# Patient Record
Sex: FEMALE | Race: WHITE | Employment: OTHER | URBAN - METROPOLITAN AREA
[De-identification: names, ages, dates, MRNs, and addresses within clinical notes are randomized per-mention and may not be internally consistent; named-entity substitution may affect disease eponyms.]

---

## 2020-03-17 NOTE — PROGRESS NOTES
Call received (pt at internal medicine for labs & has questions about medications to take the am of procedure, instructed pt is to only take pradaxa, prilosec & lisinoprl the am of procedure & can take ativan if needed.  Voiced understanding

## 2020-03-18 NOTE — PROGRESS NOTES
Pt called & notified of procedure cancellation secondary to COVID-19 threat - Instructed to notify [facility] as needed or for change in symptoms.  Voiced understanding

## 2020-03-19 ENCOUNTER — HOSPITAL ENCOUNTER (OUTPATIENT)
Dept: CARDIAC CATH/INVASIVE PROCEDURES | Age: 85
Discharge: HOME | End: 2020-03-19

## 2022-12-01 ENCOUNTER — OFFICE VISIT (OUTPATIENT)
Dept: ORTHOPEDIC SURGERY | Age: 87
End: 2022-12-01

## 2022-12-01 DIAGNOSIS — M25.512 LEFT SHOULDER PAIN, UNSPECIFIED CHRONICITY: ICD-10-CM

## 2022-12-01 DIAGNOSIS — S42.292A OTHER CLOSED DISPLACED FRACTURE OF PROXIMAL END OF LEFT HUMERUS, INITIAL ENCOUNTER: Primary | ICD-10-CM

## 2022-12-01 NOTE — PROGRESS NOTES
Name: [id number]  YOB: 1932  Gender: female  MRN: [id number]  Date of Encounter:  12/1/2022       CHIEF COMPLAINT:     Chief Complaint   Patient presents with    Shoulder Pain     Left humerus fx        SUBJECTIVE/OBJECTIVE:      HPI:    Patient is a 80 y.o. pleasant female who presents today for a new evaluation of her left shoulder. Date of injury / symptom onset: 11/21/22    She was walking with haste to the bathroom when she tripped on the door stripping and fell into the hallway wall. She was seen in the ED that day and found to have a proximal humerus fracture. She was tried 3 different slings that are not helpful for her. She has been taking tylenol, tramadol, and norco for pain. Her primary complaint is pain. She denies numbness. She has bruising down the entire arm and swelling. No history of DVT. She takes pradaxa. PAST HISTORY:   Past medical, surgical, family, social history and allergies reviewed by me. Pertinent history:   Tobacco use:  reports that she has never smoked. She has never used smokeless tobacco.  Diabetes: diabetic-non insulin  CKD: no  Anticoagulation: yes, pradaxa      REVIEW OF SYSTEMS:   As noted in HPI. PHYSICAL EXAMINATION:     Gen: Well-developed, no acute distress   HEENT: NC/AT, EOMI   Neck: Trachea midline, normal ROM   CV: Regular rhythm by palpation of distal pulse, normal capillary refill   Pulm: No respiratory distress, no stridor   Psychiatric: Well oriented, normal mood and affect. Skin: No rashes, lesions or ulcers, normal temperature, turgor, and texture on uninvolved extremity. ORTHO EXAM:    Left Shoulder:     Inspection: moderate ecchymosis of the arm, forearm, and hand with mild edema. ROM: ROM of shoulder not tested due to fracture  Tenderness: tenderness present to proximal humerus. She has minimal tenderness to distal humerus. No tenderness to epicondyles or olecranon. No forearm or hand TTP.    Strength: activates abduction, external rotation and internal rotation with pain. Active flexion, extension of elbow, supination and pronation to full ROM. 5/5    Provocative tests: not tested  Normal capillary refill / 2+ radial pulse   Sensation intact to light touch        DIAGNOSTIC IMAGING:     A 2 vw XR Grashey and scapular Y taken previously reviewed. XR shows mildly displaced proximal humerus fracture through surgical neck. Mild GH joint arthritis noted. No mass lesion. Cardiomegaly. I independently interpreted XR ordered by a different physician, taken from an outside facility    ASSESSMENT/PLAN:   1. Other closed displaced fracture of proximal end of left humerus, initial encounter    2. Left shoulder pain, unspecified chronicity         Explained xray findings, prognosis, and expected management of this fracture to patient and her daughter who is present. Her sling is not beneficial to her and she would likely benefit more from a cuff and collar. Immobilize in cuff and collar x 2 weeks. Continue tylenol / tramadol PRN pain. She is on [facility] [place]. Advised to use a stress ball and frequent hand / elbow ROM to reduce edema in arm. Reeval in 2 weeks. Orders / medications today:   Orders Placed This Encounter   Procedures    Hanging Cast Sling ()     Hanging Cast Sling ()     Standing Status:   Future     Standing Expiration Date:   3/1/2023      Follow up: Return in about 2 weeks (around 12/15/2022). The patient expressed understanding and agreed with the plan. Esha Stewart MD   [facility]     This document was created using voice recognition software so frequent mistakes are possible. For any concerns about the wording of this document, please contact its creator for further clarification.

## 2022-12-01 NOTE — PROGRESS NOTES
The patient's daughter received the hanging cast sling in the office, she was demonstrated on how to use the sling. If she has questions or needs to bring the patient back in to have a proper fitting, she is able to do so. Patient read and signed documenting they understand and agree to [facility]'s current DME return policy.

## 2022-12-01 NOTE — LETTER
DME Patient Authorization Form    Name: Aleja Candelaria  : 1932  MRN: [id number]   Age: 80 y.o. Gender: female  Delivery Address: [facility]     Diagnosis:     ICD-10-CM    1. Left shoulder pain, unspecified chronicity  M25.512 Hanging Cast Sling ()           Requested DME:  Hanging Cast APMUA- ($25.00) X 1 - left        Clinical Notes:     **Indicates non-covered items by insurance. Payment expected on date of service. Electronically signed by  Provider: Suni Mart MD__Date: 2022                            [facility]/[address] Tax ID # [id number]        Durable Medical Equipment and/or Orthotics Patient Consent     I understand that my physician has prescribed this medical supply as part of my treatment plan as a matter of Medical Necessity.  I understand that I have a choice in where I receive my prescribed orthopedic supplies and/or services.  I authorize [facility] to furnish this service/product and to provide my insurance carrier with any information requested in order to process for payment.  I instruct my insurance carrier to pay [facility] directly for these services/products.  I understand that my insurance carrier may deny payment for this supply because it is a non-covered item, deemed not medically necessary or considered experimental.   I understand that any cost not covered by my insurance carrier will be solely my financial responsibility.  I have received the Supplier Standards and have reviewed them.  I have received the prescribed item and have been fully instructed on the proper use of the above services/products.    ______ (Patient Initials) I understand that all DME items are non-returnable after being dispensed. Items still in sealed packaging may be returned up to 14 days after purchasing.  [facility] will replace items that are defective.    ______ (Patient Initials) I understand that [facility] will not file a claim with my insurance carrier for this service/product and I am waiving my right to file a claim on my own for this service/product with my insurance company as this item is NON-COVERED (Denoted by the **) by my Insurance company/policy. ______ (Patient Initials) I understand that I am responsible to bring my equipment to the hospital for any surgery. ______________________________________________  _______________________  Patient / Charli Harris            Thank you for considering [address] Your physician has prescribed specific medical equipment or devices for your home use. The following describes your rights and responsibilities as our customer. Right to Choose Providers: You have a choice regarding which company supplies your home medical equipment and devices, and to consult your physician in this decision. You may choose a medical supply store, a home medical equipment provider, or a specialist such as [facility]. [facility] will coordinate with your physician to provide the medical equipment or devices prescribed for your home use. Right to Service:  You have the right to considerate, respectful and nondiscriminatory care. You have the right to receive accurate and easily understood information about your health care. If you speak a foreign language, or don't understand the discussions, assistance will be provided to allow you to make informed health care decisions. You have the right to know your treatment options and to participate in decisions about your care, including the right to accept or refuse treatment. You have the right to expect a reasonable response to your requests for treatment or service.   You have the right to talk in confidence with health care providers and to have your health care information protected. You have the right to receive an explanation of your bill. You have the right to complain about the service or product you receive. Patient Responsibilities:  Please provide complete and accurate information about your health insurance benefits and make arrangements for the timely payment of your bill. [facility] will, if possible, assume responsibility for billing your insurance (Medicare, Medicaid and commercial) for the prescribed equipment or devices. If your policy does not cover the prescribed product, or only covers a portion of the bill, you are responsible for any remaining balance. Return and Exchange Policy:  [facility] will honor published  Warranties for products. [facility] will accept returns or exchanges within 14 days from the date of receipt, providin) the product must be in new condition; 2) receipt as required; and 3) used disposable and hygiene products may only be returned due to a defective product. Note: Refunds will be issued in a timely manner, please allow 4-6 weeks for processing. Complaint Procedures and DME Consumer Protection Resources:  [facility] values you as a customer, and is committed to resolving patient concerns. This commitment includes understanding and documenting your concerns, conducting a review of internal procedures, and providing you with an explanation and resolution to your concerns. Should you have any questions about our services or billing process, please contact our office at (practice phone number). If we are unable to resolve the concern, you have the right to direct comments to the office of Consumer Protection, in the [address] or the [facility] office, without fear of repercussion. DMEPOS SUPPLIER STANDARDS    A supplier must be in compliance with all applicable Federal and [employer] and regulatory requirements.   A supplier operations. A supplier must agree not to initiate telephone contact with beneficiaries, with a few exceptions allowed. This standard prohibits suppliers from calling beneficiaries in order to solicit new business. A supplier is responsible for delivery and must instruct beneficiaries on use of Medicare covered items, and maintain proof of delivery. A supplier must answer questions, and respond to complaints of the beneficiaries, and maintain documentation of such contacts. A supplier must maintain and replace at no charge or repair directly, or through a service contract with another company, Medicare covered items it has rented to beneficiaries. A supplier must accept returns of substandard (less than full quality for the particular item) or unsuitable items (inappropriate for the beneficiary at the time it was fitted and rented or sold) from beneficiaries. A supplier must disclose these supplier standards to each beneficiary to whom it supplies a Medicare-covered item. A supplier must disclose to the government any person having ownership, financial, or control interest in the supplier. A supplier must not convey or reassign a supplier number; i.e., the supplier may not sell or allow another entity to use its Medicare billing number. A supplier must have a complaint resolution protocol established to address beneficiary complaints that relate to these standards. A record of these complaints must be maintained at the physical facility. Complaint records must include: the name, address, telephone number and health insurance claim number of the beneficiary, a summary of the complaint, and any action taken to resolve it. A supplier must agree to furnish CMS any information required by the Medicare statute and implementing regulations. A supplier of DMEPOS and other items and services must be accredited by a CMS-approved accreditation organization in order to receive and retain a supplier billing number. The accreditation must indicate the specific products and services, for which the supplier is accredited in order for the supplier to receive payment for those specific products and services. A DMEPOS supplier must notify their accreditation organization when a new DMEPOS location is opened. The accreditation organization may accredit the new supplier location for three months after it is operational without requiring a new site visit. All DMEPOS supplier locations, whether owned or subcontracted, must meet the [employer] and be separately accredited in order to bill Medicare. An accredited supplier may be denied enrollment or their enrollment may be revoked, if CMS determines that they are not in compliance with the DMEPOS quality standards. A DMEPOS supplier must disclose upon enrollment all products and services, including the addition of new product lines for which they are seeking accreditation. If a new product line is added after enrollment, the DMEPOS supplier will be responsible for notifying the accrediting body of the new product so that the DMEPOS supplier can be re-surveyed and accredited for these new products. Must meet the surety bond requirements specified in 42 C. F.R. 424.57(c). Implementation date- May 4, 2009. A supplier must obtain oxygen from a state-licensed oxygen supplier. A supplier must maintain ordering and referring documentation consistent with provisions found in 42 C. F.R. 424.516(f). DMEPOS suppliers are prohibited from sharing a practice location with certain other Medicare providers and suppliers. DMEPOS suppliers must remain open to the public for a minimum of 30 hours per week with certain exceptions.

## 2022-12-15 ENCOUNTER — OFFICE VISIT (OUTPATIENT)
Dept: ORTHOPEDIC SURGERY | Age: 87
End: 2022-12-15

## 2022-12-15 DIAGNOSIS — S42.292D OTHER CLOSED DISPLACED FRACTURE OF PROXIMAL END OF LEFT HUMERUS WITH ROUTINE HEALING, SUBSEQUENT ENCOUNTER: Primary | ICD-10-CM

## 2022-12-15 NOTE — PROGRESS NOTES
Name: [id number]  YOB: 1932  Gender: female  MRN: [id number]  Date of Encounter:  12/15/2022       CHIEF COMPLAINT:     Chief Complaint   Patient presents with    Follow-up     Left humeral fx - 2 week f/u        SUBJECTIVE/OBJECTIVE:      HPI:    Patient is a 80 y.o. pleasant female who presents today for a return evaluation of her left proximal humerus fracture. Working diagnosis:   1. Other closed displaced fracture of proximal end of left humerus with routine healing, subsequent encounter       LOV: 12/1/22  DOI 11/21/22    She is 3 weeks post injury from her fracture. She has pain, daily, but it is improving. She takes pain medication daily. She has been wearing her cuff and collar and this helps. She has full use of her hand. No elbow pain. Swelling and bruising are improved. Some history provided by her daughter today as she is hard of hearing. PAST HISTORY:   Past medical, surgical, family, social history and allergies reviewed by me. Unchanged from prior visit. REVIEW OF SYSTEMS:   As noted in HPI. PHYSICAL EXAMINATION:     Gen: Well-developed, no acute distress   HEENT: NC/AT, EOMI   Neck: Trachea midline, normal ROM   CV: Regular rhythm by palpation of distal pulse, normal capillary refill   Pulm: No respiratory distress, no stridor   Psychiatric: Well oriented, normal mood and affect. Skin: No rashes, lesions or ulcers, normal temperature, turgor, and texture on uninvolved extremity. ORTHO EXAM:    Left Shoulder:     Inspection: healing ecchymosis, no obvious deformity  ROM: abduction to 45, external rotation to 25  Tenderness: mild proximal arm tenderness. No tenderness of elbow, forearm, or hand  Strength: assists abduction but does initiate active abduction. External rotation 4/5, Internal rotation 4/5.   5/5  Provocative tests: not tested  Normal capillary refill / 2+ radial pulse   Sensation intact to light touch       DIAGNOSTIC IMAGING:     I have reviewed prior imaging studies. ASSESSMENT/PLAN:   1. Other closed displaced fracture of proximal end of left humerus with routine healing, subsequent encounter         Advised today to begin weaning from her cuff and collar and start gentle range of motion activity / scapular motion. Take pain medication as necessary. We will see her back in 3 weeks due to holiday. Anticipate PT referral next visit. All questions answered. Orders / medications today: No orders of the defined types were placed in this encounter. Follow up: Return in about 20 days (around 1/4/2023). The patient expressed understanding and agreed with the plan. Kelsy Vaughn MD   [facility]     This document was created using voice recognition software so frequent mistakes are possible. For any concerns about the wording of this document, please contact its creator for further clarification.

## 2023-01-01 ENCOUNTER — PREP FOR PROCEDURE (OUTPATIENT)
Dept: NEUROSURGERY | Age: 88
End: 2023-01-01

## 2023-01-01 ENCOUNTER — APPOINTMENT (OUTPATIENT)
Dept: NON INVASIVE DIAGNOSTICS | Age: 88
DRG: 086 | End: 2023-01-01
Attending: INTERNAL MEDICINE
Payer: MEDICARE

## 2023-01-01 ENCOUNTER — ANESTHESIA EVENT (OUTPATIENT)
Dept: SURGERY | Age: 88
DRG: 086 | End: 2023-01-01
Payer: MEDICARE

## 2023-01-01 ENCOUNTER — HOSPICE ADMISSION (OUTPATIENT)
Dept: HOSPICE | Facility: HOSPICE | Age: 88
End: 2023-01-01
Payer: MEDICARE

## 2023-01-01 ENCOUNTER — APPOINTMENT (OUTPATIENT)
Dept: GENERAL RADIOLOGY | Age: 88
DRG: 086 | End: 2023-01-01
Attending: FAMILY MEDICINE
Payer: MEDICARE

## 2023-01-01 ENCOUNTER — HOSPITAL ENCOUNTER (INPATIENT)
Age: 88
LOS: 5 days | Discharge: HOSPICE | DRG: 086 | End: 2023-09-13
Attending: FAMILY MEDICINE | Admitting: FAMILY MEDICINE
Payer: MEDICARE

## 2023-01-01 ENCOUNTER — ANESTHESIA (OUTPATIENT)
Dept: SURGERY | Age: 88
DRG: 086 | End: 2023-01-01
Payer: MEDICARE

## 2023-01-01 ENCOUNTER — APPOINTMENT (OUTPATIENT)
Dept: CT IMAGING | Age: 88
DRG: 086 | End: 2023-01-01
Attending: FAMILY MEDICINE
Payer: MEDICARE

## 2023-01-01 VITALS
WEIGHT: 128 LBS | TEMPERATURE: 100.3 F | HEART RATE: 171 BPM | SYSTOLIC BLOOD PRESSURE: 164 MMHG | RESPIRATION RATE: 32 BRPM | HEIGHT: 62 IN | OXYGEN SATURATION: 83 % | BODY MASS INDEX: 23.55 KG/M2 | DIASTOLIC BLOOD PRESSURE: 74 MMHG

## 2023-01-01 DIAGNOSIS — I48.21 PERMANENT ATRIAL FIBRILLATION (HCC): Primary | ICD-10-CM

## 2023-01-01 DIAGNOSIS — S06.5XAA SUBDURAL HEMATOMA (HCC): ICD-10-CM

## 2023-01-01 LAB
ABO + RH BLD: NORMAL
ALBUMIN SERPL-MCNC: 3.4 G/DL (ref 3.2–4.6)
ALBUMIN/GLOB SERPL: 1.1 (ref 0.4–1.6)
ALP SERPL-CCNC: 64 U/L (ref 50–136)
ALT SERPL-CCNC: 34 U/L (ref 12–65)
ANION GAP SERPL CALC-SCNC: 11 MMOL/L (ref 2–11)
ANION GAP SERPL CALC-SCNC: 4 MMOL/L (ref 2–11)
ANION GAP SERPL CALC-SCNC: 6 MMOL/L (ref 2–11)
ANION GAP SERPL CALC-SCNC: 7 MMOL/L (ref 2–11)
ANION GAP SERPL CALC-SCNC: 9 MMOL/L (ref 2–11)
AST SERPL-CCNC: 28 U/L (ref 15–37)
BILIRUB SERPL-MCNC: 1.1 MG/DL (ref 0.2–1.1)
BLD PROD TYP BPU: NORMAL
BLD PROD TYP BPU: NORMAL
BLOOD BANK DISPENSE STATUS: NORMAL
BLOOD BANK DISPENSE STATUS: NORMAL
BLOOD GROUP ANTIBODIES SERPL: NORMAL
BPU ID: NORMAL
BPU ID: NORMAL
BUN SERPL-MCNC: 14 MG/DL (ref 8–23)
BUN SERPL-MCNC: 14 MG/DL (ref 8–23)
BUN SERPL-MCNC: 16 MG/DL (ref 8–23)
BUN SERPL-MCNC: 16 MG/DL (ref 8–23)
BUN SERPL-MCNC: 23 MG/DL (ref 8–23)
CALCIUM SERPL-MCNC: 8.7 MG/DL (ref 8.3–10.4)
CALCIUM SERPL-MCNC: 8.8 MG/DL (ref 8.3–10.4)
CALCIUM SERPL-MCNC: 8.9 MG/DL (ref 8.3–10.4)
CALCIUM SERPL-MCNC: 9.1 MG/DL (ref 8.3–10.4)
CALCIUM SERPL-MCNC: 9.2 MG/DL (ref 8.3–10.4)
CHLORIDE SERPL-SCNC: 108 MMOL/L (ref 101–110)
CHLORIDE SERPL-SCNC: 111 MMOL/L (ref 101–110)
CHLORIDE SERPL-SCNC: 113 MMOL/L (ref 101–110)
CHLORIDE SERPL-SCNC: 115 MMOL/L (ref 101–110)
CHLORIDE SERPL-SCNC: 117 MMOL/L (ref 101–110)
CO2 SERPL-SCNC: 19 MMOL/L (ref 21–32)
CO2 SERPL-SCNC: 24 MMOL/L (ref 21–32)
CO2 SERPL-SCNC: 24 MMOL/L (ref 21–32)
CO2 SERPL-SCNC: 25 MMOL/L (ref 21–32)
CO2 SERPL-SCNC: 27 MMOL/L (ref 21–32)
CREAT SERPL-MCNC: 0.8 MG/DL (ref 0.6–1)
CREAT SERPL-MCNC: 0.9 MG/DL (ref 0.6–1)
CREAT SERPL-MCNC: 1.3 MG/DL (ref 0.6–1)
ECHO AO ASC DIAM: 2.7 CM
ECHO AO ASCENDING AORTA INDEX: 1.71 CM/M2
ECHO AO ROOT DIAM: 2.4 CM
ECHO AO ROOT INDEX: 1.52 CM/M2
ECHO AV AREA PEAK VELOCITY: 1.5 CM2
ECHO AV AREA VTI: 1.6 CM2
ECHO AV AREA/BSA PEAK VELOCITY: 0.9 CM2/M2
ECHO AV AREA/BSA VTI: 1 CM2/M2
ECHO AV MEAN GRADIENT: 8 MMHG
ECHO AV MEAN VELOCITY: 1.3 M/S
ECHO AV PEAK GRADIENT: 14 MMHG
ECHO AV PEAK VELOCITY: 1.9 M/S
ECHO AV VELOCITY RATIO: 0.74
ECHO AV VTI: 33.2 CM
ECHO BSA: 1.59 M2
ECHO EST RA PRESSURE: 3 MMHG
ECHO IVC PROX: 1.2 CM
ECHO LA AREA 4C: 25.4 CM2
ECHO LA MAJOR AXIS: 6.5 CM
ECHO LA VOL 4C: 76 ML (ref 22–52)
ECHO LA VOLUME INDEX A4C: 48 ML/M2 (ref 16–34)
ECHO LV FRACTIONAL SHORTENING: 40 % (ref 28–44)
ECHO LV INTERNAL DIMENSION DIASTOLE INDEX: 2.72 CM/M2
ECHO LV INTERNAL DIMENSION DIASTOLIC: 4.3 CM (ref 3.9–5.3)
ECHO LV INTERNAL DIMENSION SYSTOLIC INDEX: 1.65 CM/M2
ECHO LV INTERNAL DIMENSION SYSTOLIC: 2.6 CM
ECHO LV IVSD: 0.8 CM (ref 0.6–0.9)
ECHO LV MASS 2D: 105.3 G (ref 67–162)
ECHO LV MASS INDEX 2D: 66.7 G/M2 (ref 43–95)
ECHO LV POSTERIOR WALL DIASTOLIC: 0.8 CM (ref 0.6–0.9)
ECHO LV RELATIVE WALL THICKNESS RATIO: 0.37
ECHO LVOT AREA: 2 CM2
ECHO LVOT AV VTI INDEX: 0.8
ECHO LVOT DIAM: 1.6 CM
ECHO LVOT MEAN GRADIENT: 4 MMHG
ECHO LVOT PEAK GRADIENT: 8 MMHG
ECHO LVOT PEAK VELOCITY: 1.4 M/S
ECHO LVOT STROKE VOLUME INDEX: 33.7 ML/M2
ECHO LVOT SV: 53.3 ML
ECHO LVOT VTI: 26.5 CM
ECHO MV E DECELERATION TIME (DT): 183 MS
ECHO MV E VELOCITY: 1.12 M/S
ECHO PV MAX VELOCITY: 1.4 M/S
ECHO PV PEAK GRADIENT: 8 MMHG
ECHO RIGHT VENTRICULAR SYSTOLIC PRESSURE (RVSP): 23 MMHG
ECHO RV TAPSE: 2.7 CM (ref 1.7–?)
ECHO TV REGURGITANT MAX VELOCITY: 2.21 M/S
ECHO TV REGURGITANT PEAK GRADIENT: 20 MMHG
EKG ATRIAL RATE: 107 BPM
EKG DIAGNOSIS: NORMAL
EKG Q-T INTERVAL: 336 MS
EKG QRS DURATION: 126 MS
EKG QTC CALCULATION (BAZETT): 444 MS
EKG R AXIS: -21 DEGREES
EKG T AXIS: 208 DEGREES
EKG VENTRICULAR RATE: 105 BPM
ERYTHROCYTE [DISTWIDTH] IN BLOOD BY AUTOMATED COUNT: 13.2 % (ref 11.9–14.6)
ERYTHROCYTE [DISTWIDTH] IN BLOOD BY AUTOMATED COUNT: 13.2 % (ref 11.9–14.6)
ERYTHROCYTE [DISTWIDTH] IN BLOOD BY AUTOMATED COUNT: 13.7 % (ref 11.9–14.6)
EST. AVERAGE GLUCOSE BLD GHB EST-MCNC: 151 MG/DL
GLOBULIN SER CALC-MCNC: 3 G/DL (ref 2.8–4.5)
GLUCOSE BLD STRIP.AUTO-MCNC: 258 MG/DL (ref 65–100)
GLUCOSE BLD STRIP.AUTO-MCNC: 279 MG/DL (ref 65–100)
GLUCOSE BLD STRIP.AUTO-MCNC: 299 MG/DL (ref 65–100)
GLUCOSE BLD STRIP.AUTO-MCNC: 397 MG/DL (ref 65–100)
GLUCOSE SERPL-MCNC: 229 MG/DL (ref 65–100)
GLUCOSE SERPL-MCNC: 245 MG/DL (ref 65–100)
GLUCOSE SERPL-MCNC: 266 MG/DL (ref 65–100)
GLUCOSE SERPL-MCNC: 306 MG/DL (ref 65–100)
GLUCOSE SERPL-MCNC: 423 MG/DL (ref 65–100)
HBA1C MFR BLD: 6.9 % (ref 4.8–5.6)
HCT VFR BLD AUTO: 34 % (ref 35.8–46.3)
HCT VFR BLD AUTO: 38.1 % (ref 35.8–46.3)
HCT VFR BLD AUTO: 38.9 % (ref 35.8–46.3)
HGB BLD-MCNC: 11.3 G/DL (ref 11.7–15.4)
HGB BLD-MCNC: 12.5 G/DL (ref 11.7–15.4)
HGB BLD-MCNC: 12.6 G/DL (ref 11.7–15.4)
MAGNESIUM SERPL-MCNC: 2 MG/DL (ref 1.8–2.4)
MAGNESIUM SERPL-MCNC: 2.1 MG/DL (ref 1.8–2.4)
MCH RBC QN AUTO: 30.8 PG (ref 26.1–32.9)
MCH RBC QN AUTO: 31 PG (ref 26.1–32.9)
MCH RBC QN AUTO: 31.2 PG (ref 26.1–32.9)
MCHC RBC AUTO-ENTMCNC: 32.4 G/DL (ref 31.4–35)
MCHC RBC AUTO-ENTMCNC: 32.8 G/DL (ref 31.4–35)
MCHC RBC AUTO-ENTMCNC: 33.2 G/DL (ref 31.4–35)
MCV RBC AUTO: 93.9 FL (ref 82–102)
MCV RBC AUTO: 94.5 FL (ref 82–102)
MCV RBC AUTO: 95.1 FL (ref 82–102)
NRBC # BLD: 0 K/UL (ref 0–0.2)
PHOSPHATE SERPL-MCNC: 2 MG/DL (ref 2.3–3.7)
PHOSPHATE SERPL-MCNC: 4 MG/DL (ref 2.3–3.7)
PLATELET # BLD AUTO: 141 K/UL (ref 150–450)
PLATELET # BLD AUTO: 144 K/UL (ref 150–450)
PLATELET # BLD AUTO: 165 K/UL (ref 150–450)
PMV BLD AUTO: 10.1 FL (ref 9.4–12.3)
PMV BLD AUTO: 10.3 FL (ref 9.4–12.3)
PMV BLD AUTO: 9.9 FL (ref 9.4–12.3)
POTASSIUM SERPL-SCNC: 3.4 MMOL/L (ref 3.5–5.1)
POTASSIUM SERPL-SCNC: 3.7 MMOL/L (ref 3.5–5.1)
POTASSIUM SERPL-SCNC: 4.2 MMOL/L (ref 3.5–5.1)
POTASSIUM SERPL-SCNC: 4.3 MMOL/L (ref 3.5–5.1)
POTASSIUM SERPL-SCNC: 4.6 MMOL/L (ref 3.5–5.1)
PROT SERPL-MCNC: 6.4 G/DL (ref 6.3–8.2)
RBC # BLD AUTO: 3.62 M/UL (ref 4.05–5.2)
RBC # BLD AUTO: 4.03 M/UL (ref 4.05–5.2)
RBC # BLD AUTO: 4.09 M/UL (ref 4.05–5.2)
SERVICE CMNT-IMP: ABNORMAL
SODIUM SERPL-SCNC: 142 MMOL/L (ref 133–143)
SODIUM SERPL-SCNC: 143 MMOL/L (ref 133–143)
SODIUM SERPL-SCNC: 143 MMOL/L (ref 133–143)
SODIUM SERPL-SCNC: 145 MMOL/L (ref 133–143)
SODIUM SERPL-SCNC: 147 MMOL/L (ref 133–143)
SPECIMEN EXP DATE BLD: NORMAL
UNIT DIVISION: 0
UNIT DIVISION: 0
WBC # BLD AUTO: 10.9 K/UL (ref 4.3–11.1)
WBC # BLD AUTO: 6.7 K/UL (ref 4.3–11.1)
WBC # BLD AUTO: 9 K/UL (ref 4.3–11.1)

## 2023-01-01 PROCEDURE — 6360000002 HC RX W HCPCS: Performed by: INTERNAL MEDICINE

## 2023-01-01 PROCEDURE — C9113 INJ PANTOPRAZOLE SODIUM, VIA: HCPCS | Performed by: INTERNAL MEDICINE

## 2023-01-01 PROCEDURE — 36415 COLL VENOUS BLD VENIPUNCTURE: CPT

## 2023-01-01 PROCEDURE — 92610 EVALUATE SWALLOWING FUNCTION: CPT

## 2023-01-01 PROCEDURE — 6360000002 HC RX W HCPCS: Performed by: FAMILY MEDICINE

## 2023-01-01 PROCEDURE — 6370000000 HC RX 637 (ALT 250 FOR IP): Performed by: FAMILY MEDICINE

## 2023-01-01 PROCEDURE — 2580000003 HC RX 258: Performed by: INTERNAL MEDICINE

## 2023-01-01 PROCEDURE — 70450 CT HEAD/BRAIN W/O DYE: CPT

## 2023-01-01 PROCEDURE — 6370000000 HC RX 637 (ALT 250 FOR IP): Performed by: INTERNAL MEDICINE

## 2023-01-01 PROCEDURE — 94640 AIRWAY INHALATION TREATMENT: CPT

## 2023-01-01 PROCEDURE — 36430 TRANSFUSION BLD/BLD COMPNT: CPT

## 2023-01-01 PROCEDURE — 83735 ASSAY OF MAGNESIUM: CPT

## 2023-01-01 PROCEDURE — 51701 INSERT BLADDER CATHETER: CPT

## 2023-01-01 PROCEDURE — 82962 GLUCOSE BLOOD TEST: CPT

## 2023-01-01 PROCEDURE — 2580000003 HC RX 258: Performed by: FAMILY MEDICINE

## 2023-01-01 PROCEDURE — A4216 STERILE WATER/SALINE, 10 ML: HCPCS | Performed by: INTERNAL MEDICINE

## 2023-01-01 PROCEDURE — 86901 BLOOD TYPING SEROLOGIC RH(D): CPT

## 2023-01-01 PROCEDURE — 93005 ELECTROCARDIOGRAM TRACING: CPT | Performed by: INTERNAL MEDICINE

## 2023-01-01 PROCEDURE — 97530 THERAPEUTIC ACTIVITIES: CPT

## 2023-01-01 PROCEDURE — 51702 INSERT TEMP BLADDER CATH: CPT

## 2023-01-01 PROCEDURE — 30233R1 TRANSFUSION OF NONAUTOLOGOUS PLATELETS INTO PERIPHERAL VEIN, PERCUTANEOUS APPROACH: ICD-10-PCS | Performed by: INTERNAL MEDICINE

## 2023-01-01 PROCEDURE — 2100000000 HC CCU R&B

## 2023-01-01 PROCEDURE — 0656 HSPC GENERAL INPATIENT

## 2023-01-01 PROCEDURE — 84100 ASSAY OF PHOSPHORUS: CPT

## 2023-01-01 PROCEDURE — 2500000003 HC RX 250 WO HCPCS: Performed by: INTERNAL MEDICINE

## 2023-01-01 PROCEDURE — 85027 COMPLETE CBC AUTOMATED: CPT

## 2023-01-01 PROCEDURE — 86850 RBC ANTIBODY SCREEN: CPT

## 2023-01-01 PROCEDURE — 83036 HEMOGLOBIN GLYCOSYLATED A1C: CPT

## 2023-01-01 PROCEDURE — 80048 BASIC METABOLIC PNL TOTAL CA: CPT

## 2023-01-01 PROCEDURE — 97163 PT EVAL HIGH COMPLEX 45 MIN: CPT

## 2023-01-01 PROCEDURE — 1100000000 HC RM PRIVATE

## 2023-01-01 PROCEDURE — 6370000000 HC RX 637 (ALT 250 FOR IP)

## 2023-01-01 PROCEDURE — 93306 TTE W/DOPPLER COMPLETE: CPT | Performed by: INTERNAL MEDICINE

## 2023-01-01 PROCEDURE — 86900 BLOOD TYPING SEROLOGIC ABO: CPT

## 2023-01-01 PROCEDURE — P9035 PLATELET PHERES LEUKOREDUCED: HCPCS

## 2023-01-01 PROCEDURE — 2500000003 HC RX 250 WO HCPCS: Performed by: FAMILY MEDICINE

## 2023-01-01 PROCEDURE — 51798 US URINE CAPACITY MEASURE: CPT

## 2023-01-01 PROCEDURE — 71045 X-RAY EXAM CHEST 1 VIEW: CPT

## 2023-01-01 PROCEDURE — 80053 COMPREHEN METABOLIC PANEL: CPT

## 2023-01-01 PROCEDURE — 97535 SELF CARE MNGMENT TRAINING: CPT

## 2023-01-01 PROCEDURE — 92526 ORAL FUNCTION THERAPY: CPT

## 2023-01-01 PROCEDURE — 97167 OT EVAL HIGH COMPLEX 60 MIN: CPT

## 2023-01-01 PROCEDURE — 93306 TTE W/DOPPLER COMPLETE: CPT

## 2023-01-01 RX ORDER — POTASSIUM CHLORIDE 7.45 MG/ML
10 INJECTION INTRAVENOUS PRN
Status: DISCONTINUED | OUTPATIENT
Start: 2023-01-01 | End: 2023-01-01

## 2023-01-01 RX ORDER — SODIUM CHLORIDE 0.9 % (FLUSH) 0.9 %
5-40 SYRINGE (ML) INJECTION PRN
Status: CANCELLED | OUTPATIENT
Start: 2023-01-01

## 2023-01-01 RX ORDER — SODIUM CHLORIDE 9 MG/ML
INJECTION, SOLUTION INTRAVENOUS PRN
Status: DISCONTINUED | OUTPATIENT
Start: 2023-01-01 | End: 2023-01-01

## 2023-01-01 RX ORDER — HYDRALAZINE HYDROCHLORIDE 20 MG/ML
10 INJECTION INTRAMUSCULAR; INTRAVENOUS EVERY 4 HOURS PRN
Status: DISCONTINUED | OUTPATIENT
Start: 2023-01-01 | End: 2023-01-01

## 2023-01-01 RX ORDER — MIDAZOLAM HYDROCHLORIDE 2 MG/2ML
2 INJECTION, SOLUTION INTRAMUSCULAR; INTRAVENOUS
Status: CANCELLED | OUTPATIENT
Start: 2023-01-01 | End: 2023-01-01

## 2023-01-01 RX ORDER — ACETAMINOPHEN 325 MG/1
650 TABLET ORAL EVERY 4 HOURS PRN
Status: DISCONTINUED | OUTPATIENT
Start: 2023-01-01 | End: 2023-01-01 | Stop reason: HOSPADM

## 2023-01-01 RX ORDER — FENTANYL CITRATE 50 UG/ML
100 INJECTION, SOLUTION INTRAMUSCULAR; INTRAVENOUS
Status: CANCELLED | OUTPATIENT
Start: 2023-01-01 | End: 2023-01-01

## 2023-01-01 RX ORDER — PROPRANOLOL HCL 60 MG
60 CAPSULE, EXTENDED RELEASE 24HR ORAL DAILY
Status: ON HOLD | COMMUNITY
End: 2023-01-01 | Stop reason: HOSPADM

## 2023-01-01 RX ORDER — MORPHINE SULFATE 2 MG/ML
2 INJECTION, SOLUTION INTRAMUSCULAR; INTRAVENOUS
Status: DISCONTINUED | OUTPATIENT
Start: 2023-01-01 | End: 2023-01-01 | Stop reason: HOSPADM

## 2023-01-01 RX ORDER — IPRATROPIUM BROMIDE AND ALBUTEROL SULFATE 2.5; .5 MG/3ML; MG/3ML
1 SOLUTION RESPIRATORY (INHALATION) EVERY 4 HOURS PRN
Status: DISCONTINUED | OUTPATIENT
Start: 2023-01-01 | End: 2023-01-01

## 2023-01-01 RX ORDER — ONDANSETRON 2 MG/ML
4 INJECTION INTRAMUSCULAR; INTRAVENOUS EVERY 6 HOURS PRN
Status: DISCONTINUED | OUTPATIENT
Start: 2023-01-01 | End: 2023-01-01

## 2023-01-01 RX ORDER — HYDRALAZINE HYDROCHLORIDE 20 MG/ML
10 INJECTION INTRAMUSCULAR; INTRAVENOUS
Status: CANCELLED | OUTPATIENT
Start: 2023-01-01

## 2023-01-01 RX ORDER — PANTOPRAZOLE SODIUM 40 MG/1
40 TABLET, DELAYED RELEASE ORAL
Status: DISCONTINUED | OUTPATIENT
Start: 2023-01-01 | End: 2023-01-01

## 2023-01-01 RX ORDER — GUAIFENESIN 600 MG/1
600 TABLET, EXTENDED RELEASE ORAL 2 TIMES DAILY
Status: DISCONTINUED | OUTPATIENT
Start: 2023-01-01 | End: 2023-01-01

## 2023-01-01 RX ORDER — SODIUM CHLORIDE 9 MG/ML
INJECTION, SOLUTION INTRAVENOUS PRN
Status: CANCELLED | OUTPATIENT
Start: 2023-01-01

## 2023-01-01 RX ORDER — DEXTROSE AND SODIUM CHLORIDE 5; .9 G/100ML; G/100ML
INJECTION, SOLUTION INTRAVENOUS CONTINUOUS
Status: DISCONTINUED | OUTPATIENT
Start: 2023-01-01 | End: 2023-01-01

## 2023-01-01 RX ORDER — LANOLIN ALCOHOL/MO/W.PET/CERES
400 CREAM (GRAM) TOPICAL DAILY
Status: DISCONTINUED | OUTPATIENT
Start: 2023-01-01 | End: 2023-01-01

## 2023-01-01 RX ORDER — PROCHLORPERAZINE EDISYLATE 5 MG/ML
5 INJECTION INTRAMUSCULAR; INTRAVENOUS
Status: CANCELLED | OUTPATIENT
Start: 2023-01-01 | End: 2023-01-01

## 2023-01-01 RX ORDER — OXYCODONE HYDROCHLORIDE 5 MG/1
5 TABLET ORAL
Status: CANCELLED | OUTPATIENT
Start: 2023-01-01 | End: 2023-01-01

## 2023-01-01 RX ORDER — INSULIN LISPRO 100 [IU]/ML
0-4 INJECTION, SOLUTION INTRAVENOUS; SUBCUTANEOUS NIGHTLY
Status: DISCONTINUED | OUTPATIENT
Start: 2023-01-01 | End: 2023-01-01

## 2023-01-01 RX ORDER — HYDROMORPHONE HYDROCHLORIDE 2 MG/ML
0.25 INJECTION, SOLUTION INTRAMUSCULAR; INTRAVENOUS; SUBCUTANEOUS EVERY 5 MIN PRN
Status: CANCELLED | OUTPATIENT
Start: 2023-01-01

## 2023-01-01 RX ORDER — MORPHINE SULFATE 2 MG/ML
2 INJECTION, SOLUTION INTRAMUSCULAR; INTRAVENOUS EVERY 4 HOURS PRN
Status: DISCONTINUED | OUTPATIENT
Start: 2023-01-01 | End: 2023-01-01

## 2023-01-01 RX ORDER — ONDANSETRON 2 MG/ML
4 INJECTION INTRAMUSCULAR; INTRAVENOUS
Status: CANCELLED | OUTPATIENT
Start: 2023-01-01 | End: 2023-01-01

## 2023-01-01 RX ORDER — IPRATROPIUM BROMIDE AND ALBUTEROL SULFATE 2.5; .5 MG/3ML; MG/3ML
1 SOLUTION RESPIRATORY (INHALATION) ONCE
Status: COMPLETED | OUTPATIENT
Start: 2023-01-01 | End: 2023-01-01

## 2023-01-01 RX ORDER — INSULIN LISPRO 100 [IU]/ML
0-8 INJECTION, SOLUTION INTRAVENOUS; SUBCUTANEOUS
Status: DISCONTINUED | OUTPATIENT
Start: 2023-01-01 | End: 2023-01-01

## 2023-01-01 RX ORDER — LEVETIRACETAM 500 MG/5ML
1000 INJECTION, SOLUTION, CONCENTRATE INTRAVENOUS ONCE
Status: COMPLETED | OUTPATIENT
Start: 2023-01-01 | End: 2023-01-01

## 2023-01-01 RX ORDER — LABETALOL HYDROCHLORIDE 5 MG/ML
10 INJECTION, SOLUTION INTRAVENOUS
Status: DISCONTINUED | OUTPATIENT
Start: 2023-01-01 | End: 2023-01-01

## 2023-01-01 RX ORDER — POTASSIUM CHLORIDE 20 MEQ/1
40 TABLET, EXTENDED RELEASE ORAL PRN
Status: DISCONTINUED | OUTPATIENT
Start: 2023-01-01 | End: 2023-01-01

## 2023-01-01 RX ORDER — ONDANSETRON 4 MG/1
4 TABLET, ORALLY DISINTEGRATING ORAL EVERY 8 HOURS PRN
Status: DISCONTINUED | OUTPATIENT
Start: 2023-01-01 | End: 2023-01-01

## 2023-01-01 RX ORDER — MORPHINE SULFATE 2 MG/ML
1 INJECTION, SOLUTION INTRAMUSCULAR; INTRAVENOUS ONCE
Status: COMPLETED | OUTPATIENT
Start: 2023-01-01 | End: 2023-01-01

## 2023-01-01 RX ORDER — HYDROMORPHONE HYDROCHLORIDE 2 MG/ML
0.5 INJECTION, SOLUTION INTRAMUSCULAR; INTRAVENOUS; SUBCUTANEOUS EVERY 5 MIN PRN
Status: CANCELLED | OUTPATIENT
Start: 2023-01-01

## 2023-01-01 RX ORDER — MAGNESIUM SULFATE IN WATER 40 MG/ML
2000 INJECTION, SOLUTION INTRAVENOUS PRN
Status: DISCONTINUED | OUTPATIENT
Start: 2023-01-01 | End: 2023-01-01

## 2023-01-01 RX ORDER — POTASSIUM CHLORIDE 29.8 MG/ML
20 INJECTION INTRAVENOUS PRN
Status: DISCONTINUED | OUTPATIENT
Start: 2023-01-01 | End: 2023-01-01

## 2023-01-01 RX ORDER — LEVETIRACETAM 500 MG/5ML
500 INJECTION, SOLUTION, CONCENTRATE INTRAVENOUS EVERY 12 HOURS
Status: DISCONTINUED | OUTPATIENT
Start: 2023-01-01 | End: 2023-01-01

## 2023-01-01 RX ORDER — MORPHINE SULFATE 2 MG/ML
2 INJECTION, SOLUTION INTRAMUSCULAR; INTRAVENOUS ONCE
Status: COMPLETED | OUTPATIENT
Start: 2023-01-01 | End: 2023-01-01

## 2023-01-01 RX ORDER — SODIUM CHLORIDE 0.9 % (FLUSH) 0.9 %
5-40 SYRINGE (ML) INJECTION PRN
Status: DISCONTINUED | OUTPATIENT
Start: 2023-01-01 | End: 2023-01-01

## 2023-01-01 RX ORDER — ACETAMINOPHEN 500 MG
1000 TABLET ORAL ONCE
Status: CANCELLED | OUTPATIENT
Start: 2023-01-01 | End: 2023-01-01

## 2023-01-01 RX ORDER — SODIUM CHLORIDE 0.9 % (FLUSH) 0.9 %
5-40 SYRINGE (ML) INJECTION EVERY 12 HOURS SCHEDULED
Status: CANCELLED | OUTPATIENT
Start: 2023-01-01

## 2023-01-01 RX ORDER — SODIUM CHLORIDE 0.9 % (FLUSH) 0.9 %
5-40 SYRINGE (ML) INJECTION EVERY 12 HOURS SCHEDULED
Status: DISCONTINUED | OUTPATIENT
Start: 2023-01-01 | End: 2023-01-01

## 2023-01-01 RX ORDER — LABETALOL HYDROCHLORIDE 5 MG/ML
10 INJECTION, SOLUTION INTRAVENOUS
Status: CANCELLED | OUTPATIENT
Start: 2023-01-01

## 2023-01-01 RX ORDER — SODIUM CHLORIDE, SODIUM LACTATE, POTASSIUM CHLORIDE, CALCIUM CHLORIDE 600; 310; 30; 20 MG/100ML; MG/100ML; MG/100ML; MG/100ML
INJECTION, SOLUTION INTRAVENOUS CONTINUOUS
Status: DISCONTINUED | OUTPATIENT
Start: 2023-01-01 | End: 2023-01-01

## 2023-01-01 RX ORDER — ACETAMINOPHEN 650 MG/1
650 SUPPOSITORY RECTAL EVERY 4 HOURS PRN
Status: DISCONTINUED | OUTPATIENT
Start: 2023-01-01 | End: 2023-01-01 | Stop reason: HOSPADM

## 2023-01-01 RX ORDER — SODIUM CHLORIDE, SODIUM LACTATE, POTASSIUM CHLORIDE, CALCIUM CHLORIDE 600; 310; 30; 20 MG/100ML; MG/100ML; MG/100ML; MG/100ML
INJECTION, SOLUTION INTRAVENOUS CONTINUOUS
Status: CANCELLED | OUTPATIENT
Start: 2023-01-01

## 2023-01-01 RX ORDER — LORAZEPAM 2 MG/ML
0.5 INJECTION INTRAMUSCULAR
Status: DISCONTINUED | OUTPATIENT
Start: 2023-01-01 | End: 2023-01-01 | Stop reason: HOSPADM

## 2023-01-01 RX ORDER — LIDOCAINE HYDROCHLORIDE 10 MG/ML
1 INJECTION, SOLUTION INFILTRATION; PERINEURAL
Status: CANCELLED | OUTPATIENT
Start: 2023-01-01 | End: 2023-01-01

## 2023-01-01 RX ADMIN — INSULIN LISPRO 4 UNITS: 100 INJECTION, SOLUTION INTRAVENOUS; SUBCUTANEOUS at 11:49

## 2023-01-01 RX ADMIN — LABETALOL HYDROCHLORIDE 10 MG: 5 INJECTION INTRAVENOUS at 10:07

## 2023-01-01 RX ADMIN — MORPHINE SULFATE 2 MG: 2 INJECTION, SOLUTION INTRAMUSCULAR; INTRAVENOUS at 04:40

## 2023-01-01 RX ADMIN — ACETAMINOPHEN 650 MG: 650 SUPPOSITORY RECTAL at 12:52

## 2023-01-01 RX ADMIN — IPRATROPIUM BROMIDE AND ALBUTEROL SULFATE 1 DOSE: .5; 3 SOLUTION RESPIRATORY (INHALATION) at 19:45

## 2023-01-01 RX ADMIN — SODIUM CHLORIDE 6.5 MG/HR: 9 INJECTION, SOLUTION INTRAVENOUS at 14:38

## 2023-01-01 RX ADMIN — MORPHINE SULFATE 2 MG: 2 INJECTION, SOLUTION INTRAMUSCULAR; INTRAVENOUS at 18:53

## 2023-01-01 RX ADMIN — SODIUM CHLORIDE 7 MG/HR: 9 INJECTION, SOLUTION INTRAVENOUS at 21:22

## 2023-01-01 RX ADMIN — MORPHINE SULFATE 2 MG: 2 INJECTION, SOLUTION INTRAMUSCULAR; INTRAVENOUS at 09:25

## 2023-01-01 RX ADMIN — MORPHINE SULFATE 2 MG: 2 INJECTION, SOLUTION INTRAMUSCULAR; INTRAVENOUS at 12:54

## 2023-01-01 RX ADMIN — DEXTROSE AND SODIUM CHLORIDE: 5; 900 INJECTION, SOLUTION INTRAVENOUS at 17:48

## 2023-01-01 RX ADMIN — LEVETIRACETAM 500 MG: 500 INJECTION, SOLUTION INTRAVENOUS at 07:28

## 2023-01-01 RX ADMIN — ACETAMINOPHEN 650 MG: 650 SUPPOSITORY RECTAL at 16:04

## 2023-01-01 RX ADMIN — ACETAMINOPHEN 650 MG: 650 SUPPOSITORY RECTAL at 18:32

## 2023-01-01 RX ADMIN — LEVETIRACETAM 1000 MG: 500 INJECTION, SOLUTION INTRAVENOUS at 23:52

## 2023-01-01 RX ADMIN — LABETALOL HYDROCHLORIDE 10 MG: 5 INJECTION INTRAVENOUS at 04:31

## 2023-01-01 RX ADMIN — DEXTROSE AND SODIUM CHLORIDE: 5; 900 INJECTION, SOLUTION INTRAVENOUS at 23:26

## 2023-01-01 RX ADMIN — SODIUM CHLORIDE 9.5 MG/HR: 9 INJECTION, SOLUTION INTRAVENOUS at 03:58

## 2023-01-01 RX ADMIN — POTASSIUM CHLORIDE 10 MEQ: 7.46 INJECTION, SOLUTION INTRAVENOUS at 16:04

## 2023-01-01 RX ADMIN — POTASSIUM PHOSPHATE, MONOBASIC AND POTASSIUM PHOSPHATE, DIBASIC 15 MMOL: 224; 236 INJECTION, SOLUTION, CONCENTRATE INTRAVENOUS at 17:38

## 2023-01-01 RX ADMIN — LORAZEPAM 0.5 MG: 2 INJECTION INTRAMUSCULAR; INTRAVENOUS at 05:33

## 2023-01-01 RX ADMIN — INSULIN LISPRO 8 UNITS: 100 INJECTION, SOLUTION INTRAVENOUS; SUBCUTANEOUS at 07:35

## 2023-01-01 RX ADMIN — SODIUM CHLORIDE 40 MG: 9 INJECTION INTRAMUSCULAR; INTRAVENOUS; SUBCUTANEOUS at 07:27

## 2023-01-01 RX ADMIN — ACETAMINOPHEN 650 MG: 650 SUPPOSITORY RECTAL at 22:56

## 2023-01-01 RX ADMIN — SODIUM CHLORIDE 40 MG: 9 INJECTION INTRAMUSCULAR; INTRAVENOUS; SUBCUTANEOUS at 07:46

## 2023-01-01 RX ADMIN — POTASSIUM CHLORIDE 10 MEQ: 7.46 INJECTION, SOLUTION INTRAVENOUS at 19:55

## 2023-01-01 RX ADMIN — LEVETIRACETAM 500 MG: 500 INJECTION, SOLUTION INTRAVENOUS at 22:10

## 2023-01-01 RX ADMIN — LEVETIRACETAM 500 MG: 500 INJECTION, SOLUTION INTRAVENOUS at 10:28

## 2023-01-01 RX ADMIN — SODIUM CHLORIDE 7 MG/HR: 9 INJECTION, SOLUTION INTRAVENOUS at 06:56

## 2023-01-01 RX ADMIN — SODIUM CHLORIDE, PRESERVATIVE FREE 10 ML: 5 INJECTION INTRAVENOUS at 23:52

## 2023-01-01 RX ADMIN — SODIUM CHLORIDE, PRESERVATIVE FREE 10 ML: 5 INJECTION INTRAVENOUS at 21:06

## 2023-01-01 RX ADMIN — MORPHINE SULFATE 2 MG: 2 INJECTION, SOLUTION INTRAMUSCULAR; INTRAVENOUS at 15:15

## 2023-01-01 RX ADMIN — LEVETIRACETAM 500 MG: 500 INJECTION, SOLUTION INTRAVENOUS at 09:46

## 2023-01-01 RX ADMIN — SODIUM CHLORIDE 5 MG/HR: 9 INJECTION, SOLUTION INTRAVENOUS at 01:48

## 2023-01-01 RX ADMIN — LORAZEPAM 0.5 MG: 2 INJECTION INTRAMUSCULAR; INTRAVENOUS at 09:22

## 2023-01-01 RX ADMIN — HYDRALAZINE HYDROCHLORIDE 10 MG: 20 INJECTION INTRAMUSCULAR; INTRAVENOUS at 22:52

## 2023-01-01 RX ADMIN — SODIUM CHLORIDE 5 MG/HR: 900 INJECTION, SOLUTION INTRAVENOUS at 09:00

## 2023-01-01 RX ADMIN — POTASSIUM CHLORIDE 10 MEQ: 7.46 INJECTION, SOLUTION INTRAVENOUS at 17:30

## 2023-01-01 RX ADMIN — LABETALOL HYDROCHLORIDE 10 MG: 5 INJECTION INTRAVENOUS at 15:25

## 2023-01-01 RX ADMIN — LABETALOL HYDROCHLORIDE 10 MG: 5 INJECTION INTRAVENOUS at 12:46

## 2023-01-01 RX ADMIN — MAGNESIUM GLUCONATE 500 MG ORAL TABLET 400 MG: 500 TABLET ORAL at 07:40

## 2023-01-01 RX ADMIN — SODIUM CHLORIDE 7 MG/HR: 9 INJECTION, SOLUTION INTRAVENOUS at 17:47

## 2023-01-01 RX ADMIN — SODIUM CHLORIDE, PRESERVATIVE FREE 10 ML: 5 INJECTION INTRAVENOUS at 21:24

## 2023-01-01 RX ADMIN — LEVETIRACETAM 500 MG: 500 INJECTION, SOLUTION INTRAVENOUS at 23:55

## 2023-01-01 RX ADMIN — MORPHINE SULFATE 2 MG: 2 INJECTION, SOLUTION INTRAMUSCULAR; INTRAVENOUS at 10:25

## 2023-01-01 RX ADMIN — DEXTROSE AND SODIUM CHLORIDE: 5; 900 INJECTION, SOLUTION INTRAVENOUS at 10:00

## 2023-01-01 RX ADMIN — LABETALOL HYDROCHLORIDE 10 MG: 5 INJECTION INTRAVENOUS at 06:14

## 2023-01-01 RX ADMIN — SODIUM CHLORIDE, PRESERVATIVE FREE 10 ML: 5 INJECTION INTRAVENOUS at 09:39

## 2023-01-01 RX ADMIN — SODIUM CHLORIDE, PRESERVATIVE FREE 10 ML: 5 INJECTION INTRAVENOUS at 20:18

## 2023-01-01 RX ADMIN — LEVETIRACETAM 500 MG: 500 INJECTION, SOLUTION INTRAVENOUS at 22:43

## 2023-01-01 RX ADMIN — IPRATROPIUM BROMIDE AND ALBUTEROL SULFATE 1 DOSE: .5; 3 SOLUTION RESPIRATORY (INHALATION) at 02:18

## 2023-01-01 RX ADMIN — HYDRALAZINE HYDROCHLORIDE 10 MG: 20 INJECTION INTRAMUSCULAR; INTRAVENOUS at 14:50

## 2023-01-01 RX ADMIN — SODIUM CHLORIDE 40 MG: 9 INJECTION INTRAMUSCULAR; INTRAVENOUS; SUBCUTANEOUS at 09:38

## 2023-01-01 RX ADMIN — LABETALOL HYDROCHLORIDE 10 MG: 5 INJECTION INTRAVENOUS at 01:06

## 2023-01-01 RX ADMIN — MORPHINE SULFATE 1 MG: 2 INJECTION, SOLUTION INTRAMUSCULAR; INTRAVENOUS at 17:54

## 2023-01-01 RX ADMIN — LABETALOL HYDROCHLORIDE 10 MG: 5 INJECTION INTRAVENOUS at 01:48

## 2023-01-01 RX ADMIN — SODIUM CHLORIDE 40 MG: 9 INJECTION INTRAMUSCULAR; INTRAVENOUS; SUBCUTANEOUS at 14:29

## 2023-01-01 RX ADMIN — POTASSIUM CHLORIDE 10 MEQ: 7.46 INJECTION, SOLUTION INTRAVENOUS at 22:12

## 2023-01-01 RX ADMIN — SODIUM CHLORIDE, PRESERVATIVE FREE 10 ML: 5 INJECTION INTRAVENOUS at 07:46

## 2023-01-01 RX ADMIN — HYDRALAZINE HYDROCHLORIDE 10 MG: 20 INJECTION INTRAMUSCULAR; INTRAVENOUS at 00:57

## 2023-01-01 RX ADMIN — SODIUM CHLORIDE 5 MG/HR: 9 INJECTION, SOLUTION INTRAVENOUS at 17:49

## 2023-01-01 RX ADMIN — SODIUM CHLORIDE, PRESERVATIVE FREE 10 ML: 5 INJECTION INTRAVENOUS at 09:48

## 2023-01-01 RX ADMIN — INSULIN LISPRO 4 UNITS: 100 INJECTION, SOLUTION INTRAVENOUS; SUBCUTANEOUS at 17:36

## 2023-01-01 RX ADMIN — MORPHINE SULFATE 2 MG: 2 INJECTION, SOLUTION INTRAMUSCULAR; INTRAVENOUS at 11:11

## 2023-01-01 RX ADMIN — MORPHINE SULFATE 2 MG: 2 INJECTION, SOLUTION INTRAMUSCULAR; INTRAVENOUS at 11:53

## 2023-01-01 RX ADMIN — LORAZEPAM 0.5 MG: 2 INJECTION INTRAMUSCULAR; INTRAVENOUS at 13:31

## 2023-01-01 RX ADMIN — ACETAMINOPHEN 650 MG: 325 TABLET ORAL at 07:39

## 2023-01-01 RX ADMIN — MORPHINE SULFATE 2 MG: 2 INJECTION, SOLUTION INTRAMUSCULAR; INTRAVENOUS at 22:08

## 2023-01-01 RX ADMIN — SODIUM CHLORIDE 9.5 MG/HR: 9 INJECTION, SOLUTION INTRAVENOUS at 01:00

## 2023-01-01 RX ADMIN — LEVETIRACETAM 500 MG: 500 INJECTION, SOLUTION INTRAVENOUS at 10:33

## 2023-01-01 RX ADMIN — SODIUM CHLORIDE, PRESERVATIVE FREE 10 ML: 5 INJECTION INTRAVENOUS at 07:29

## 2023-01-01 RX ADMIN — HYDRALAZINE HYDROCHLORIDE 10 MG: 20 INJECTION INTRAMUSCULAR; INTRAVENOUS at 17:01

## 2023-01-01 RX ADMIN — SODIUM CHLORIDE 5 MG/HR: 9 INJECTION, SOLUTION INTRAVENOUS at 08:58

## 2023-01-01 ASSESSMENT — PAIN SCALES - GENERAL
PAINLEVEL_OUTOF10: 0
PAINLEVEL_OUTOF10: 3
PAINLEVEL_OUTOF10: 0
PAINLEVEL_OUTOF10: 0
PAINLEVEL_OUTOF10: 1
PAINLEVEL_OUTOF10: 0
PAINLEVEL_OUTOF10: 0
PAINLEVEL_OUTOF10: 3
PAINLEVEL_OUTOF10: 5
PAINLEVEL_OUTOF10: 3
PAINLEVEL_OUTOF10: 3
PAINLEVEL_OUTOF10: 0
PAINLEVEL_OUTOF10: 2
PAINLEVEL_OUTOF10: 3
PAINLEVEL_OUTOF10: 3
PAINLEVEL_OUTOF10: 0

## 2023-01-01 ASSESSMENT — PAIN DESCRIPTION - ORIENTATION
ORIENTATION: RIGHT;LEFT
ORIENTATION: RIGHT;LEFT

## 2023-01-01 ASSESSMENT — PAIN DESCRIPTION - DESCRIPTORS: DESCRIPTORS: SPASM

## 2023-01-01 ASSESSMENT — PAIN DESCRIPTION - LOCATION
LOCATION: LEG
LOCATION: LEG

## 2023-01-05 ENCOUNTER — HOSPITAL ENCOUNTER (OUTPATIENT)
Dept: GENERAL RADIOLOGY | Age: 88
Discharge: HOME | End: 2023-01-07
Payer: MEDICARE

## 2023-01-05 ENCOUNTER — OFFICE VISIT (OUTPATIENT)
Dept: ORTHOPEDIC SURGERY | Age: 88
End: 2023-01-05

## 2023-01-05 DIAGNOSIS — S42.292D OTHER CLOSED DISPLACED FRACTURE OF PROXIMAL END OF LEFT HUMERUS WITH ROUTINE HEALING, SUBSEQUENT ENCOUNTER: ICD-10-CM

## 2023-01-05 DIAGNOSIS — S42.292G OTHER CLOSED DISPLACED FRACTURE OF PROXIMAL END OF LEFT HUMERUS WITH DELAYED HEALING, SUBSEQUENT ENCOUNTER: Primary | ICD-10-CM

## 2023-01-05 PROCEDURE — 73030 X-RAY EXAM OF SHOULDER: CPT

## 2023-01-05 NOTE — PROGRESS NOTES
Name: [id number] KE[address] Gerard  YOB: 1932  Gender: female  MRN: [id number]  Date of Encounter:  1/6/2023       CHIEF COMPLAINT:     Chief Complaint   Patient presents with    Follow-up     Left humeral fx - f/u        SUBJECTIVE/OBJECTIVE:      HPI:    Patient is a 80 y.o. pleasant female who presents today for a return evaluation of her left proximal humerus fracture. Working diagnosis:   1. Other closed displaced fracture of proximal end of left humerus with delayed healing, subsequent encounter       LOV: 12/15/22    DOI: 11/21/22    She continues to have pain. Daughter here present feels like pain really has not improved much since last visit. She is not wearing her cuff and collar anymore but not much progress has been made with range of motion. Patient not able to give excellent history today, she talks about her initial fall and the emergency visit. PAST HISTORY:   Past medical, surgical, family, social history and allergies reviewed by me. Unchanged from prior visit. REVIEW OF SYSTEMS:   As noted in HPI. PHYSICAL EXAMINATION:     Gen: Well-developed, no acute distress   HEENT: NC/AT, EOMI   Neck: Trachea midline, normal ROM   CV: Regular rhythm by palpation of distal pulse, normal capillary refill   Pulm: No respiratory distress, no stridor   Psychiatric: Well oriented, normal mood and affect. Skin: No rashes, lesions or ulcers, normal temperature, turgor, and texture on uninvolved extremity.       ORTHO EXAM:    Left Shoulder:     Inspection: No deformity, No edema  ROM: Active forward flexion 60, abduction 60, Internal rotation back pocket, External rotation 30  Tenderness: tenderness to palp of proximal humerus and rotator cuff  Strength: Abduction 4/5, External rotation 4/5, Internal rotation 4+/5  Provocative tests:   Normal capillary refill / 2+ radial pulse   Sensation intact to light touch       DIAGNOSTIC IMAGING:     View x-ray left shoulder Grashey, axillary, scapular Y taken today for fracture    Findings: There is evidence of bony bridging to the distal lateral fracture portion seen compared to initial fracture imaging. Positioning likely accounts for change in alignment of the fracture, I do not highly suspect any further displacement. Impression: Healing proximal humerus fracture    ASSESSMENT/PLAN:   1. Other closed displaced fracture of proximal end of left humerus with delayed healing, subsequent encounter         She has slow healing. I have concerns of nonunion, but at this point I would advise we continue conservative management and beginning PT. A PT referral was placed today. If she continues to have pain, we can consider injection around 3 months. Her daughter and patient are agreeable with this plan. For pain, I advised continued tylenol, heat to the scapula for spasm, voltaren gel. Follow up in 4 weeks. Orders / medications today:   Orders Placed This Encounter   Procedures    XR SHOULDER LEFT (MIN 2 VIEWS)     Grashey, Axillary, and Outlet     Standing Status:   Future     Number of Occurrences:   1     Standing Expiration Date:   1/5/2024    Ambulatory referral to Physical Therapy     Referral Priority:   Routine     Referral Type:   Eval and Treat     Referral Reason:   Patient Preference     Number of Visits Requested:   1      Follow up: Return in about 4 weeks (around 2/2/2023). The patient expressed understanding and agreed with the plan. Baron Jackson MD   [facility]     This document was created using voice recognition software so frequent mistakes are possible. For any concerns about the wording of this document, please contact its creator for further clarification.

## 2023-02-02 ENCOUNTER — OFFICE VISIT (OUTPATIENT)
Dept: ORTHOPEDIC SURGERY | Age: 88
End: 2023-02-02

## 2023-02-02 DIAGNOSIS — S42.292G OTHER CLOSED DISPLACED FRACTURE OF PROXIMAL END OF LEFT HUMERUS WITH DELAYED HEALING, SUBSEQUENT ENCOUNTER: Primary | ICD-10-CM

## 2023-02-02 NOTE — PROGRESS NOTES
Name: [id number] KEISHA Gerard  YOB: 1932  Gender: female  MRN: [id number]  Date of Encounter:  2/2/2023       CHIEF COMPLAINT:     Chief Complaint   Patient presents with    Follow-up     Left humeral fx         SUBJECTIVE/OBJECTIVE:      HPI:    Patient is a 80 y.o. pleasant female who presents today for a return evaluation of her left proximal humerus fracture. She was last seen a month ago. Date of injury was just before Thanksgiving. She has been improving significantly since her last encounter. She still has pain day-to-day, but her motion is improved. She has been in physical therapy for a week and she has been enjoying this and feels like that is helping. Her daughter has also noticed a lot of improvement. No new complaints today. PAST HISTORY:   Past medical, surgical, family, social history and allergies reviewed by me. Unchanged from prior visit. REVIEW OF SYSTEMS:   As noted in HPI. PHYSICAL EXAMINATION:     Gen: Well-developed, no acute distress   HEENT: NC/AT, EOMI   Neck: Trachea midline, normal ROM   CV: Regular rhythm by palpation of distal pulse, normal capillary refill   Pulm: No respiratory distress, no stridor   Psychiatric: Well oriented, normal mood and affect. Skin: No rashes, lesions or ulcers, normal temperature, turgor, and texture on uninvolved extremity. ORTHO EXAM:    L shoulder exam:     No significant deformity, ecchymosis, edema  Abduction and forward flexion to 110. External rotation 35, internal rotation to sacrum with minimal discomfort  Mild tenderness to palpation of proximal humerus  Resisted abduction and external rotation 4/5. Resisted internal rotation 4+/5  2+ radial pulse  Sensation intact to light touch in hand and arm    DIAGNOSTIC IMAGING:     I have reviewed prior imaging studies. ASSESSMENT/PLAN:   1.  Other closed displaced fracture of proximal end of left humerus with delayed healing, subsequent encounter         She has progressed well in terms of her range of motion and pain over the past month. I discussed with her and her daughter to continue with her physical therapy which we just began, as I expect to continue to see gains in terms of her range of motion and strength. I did advise to her daughter that we may expect some chronic deficits. If she continues to have pain at 3 months we can consider joint injection. I will have her follow-up in 6 weeks for reevaluation. Orders / medications today: No orders of the defined types were placed in this encounter. Follow up: Return in about 6 weeks (around 3/16/2023). The patient expressed understanding and agreed with the plan. Kendell Van MD   [facility]     This document was created using voice recognition software so frequent mistakes are possible. For any concerns about the wording of this document, please contact its creator for further clarification.

## 2023-03-17 ENCOUNTER — OFFICE VISIT (OUTPATIENT)
Dept: ORTHOPEDIC SURGERY | Age: 88
End: 2023-03-17

## 2023-03-17 ENCOUNTER — HOSPITAL ENCOUNTER (OUTPATIENT)
Dept: GENERAL RADIOLOGY | Age: 88
End: 2023-03-17
Payer: MEDICARE

## 2023-03-17 DIAGNOSIS — S42.292G OTHER CLOSED DISPLACED FRACTURE OF PROXIMAL END OF LEFT HUMERUS WITH DELAYED HEALING, SUBSEQUENT ENCOUNTER: ICD-10-CM

## 2023-03-17 DIAGNOSIS — S42.295K OTHER CLOSED NONDISPLACED FRACTURE OF PROXIMAL END OF LEFT HUMERUS WITH NONUNION, SUBSEQUENT ENCOUNTER: Primary | ICD-10-CM

## 2023-03-17 PROCEDURE — 73030 X-RAY EXAM OF SHOULDER: CPT

## 2023-03-17 NOTE — PROGRESS NOTES
Name: Jordy Hinkle  YOB: 1932  Gender: female  MRN: [id number]  Date of Encounter:  3/17/2023       CHIEF COMPLAINT:     Chief Complaint   Patient presents with    Follow-up     L humeral fx        SUBJECTIVE/OBJECTIVE:      HPI:    Patient is a 89 y.o. pleasant female who presents today for a return evaluation of her left shoulder.    Working diagnosis:   1. 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  She reports no pain to family.  She is doing very well and they are pleased with her improvement.    PAST HISTORY:   Past medical, surgical, family, social history and allergies reviewed by me. Unchanged from prior visit.     REVIEW OF SYSTEMS:   As noted in HPI.     PHYSICAL EXAMINATION:     Gen: Well-developed, no acute distress   HEENT: NC/AT, EOMI   Neck: Trachea midline, normal ROM   CV: Regular rhythm by palpation of distal pulse, normal capillary refill   Pulm: No respiratory distress, no stridor   Psychiatric: Well oriented, normal mood and affect.   Skin: No rashes, lesions or ulcers, normal temperature, turgor, and texture on uninvolved extremity.      ORTHO EXAM:    Left shoulder:  Forward flexion 130, abduction 130.  External rotation 35  4/5 resisted abduction, 4+/5 resisted external and internal rotation  No tenderness on exam  2+ radial pulse    DIAGNOSTIC IMAGING:     3 vw XR of left shoulder taken today for fracture:     Findings:   There is nonunion of the fracture. There is some evidence of healing and no displacement of the fracture. Mild AC and GH arthritis.     Impression: Nonunion of proximal humerus fracture.      ASSESSMENT/PLAN:   1. 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  Given her lack of pain and improvement of her motion on exam, I would not make recommendations for any operative treatment at this time.  If she has pain we could consider glenohumeral joint injection initially.  Would not recommend further imaging at this time.  They are agreeable with the treatment plan.  Advised to call as needed if worsening pain.    Orders / medications today:   Orders Placed This Encounter    XR SHOULDER LEFT (MIN 2 VIEWS)     Grashey, Axillary, and Outlet     Standing Status:   Future     Number of Occurrences:   1     Standing Expiration Date:   3/16/2024      Follow up: Return if symptoms worsen or fail to improve.       The patient expressed understanding and agreed with the plan.     Jennifer Senior MD   Orthopaedics and Sports Medicine  [facility]     This document was created using voice recognition software so frequent mistakes are possible. For any concerns about the wording of this document, please contact its creator for further clarification.

## 2023-09-08 ENCOUNTER — APPOINTMENT (OUTPATIENT)
Dept: GENERAL RADIOLOGY | Age: 88
End: 2023-09-08
Payer: MEDICARE

## 2023-09-08 ENCOUNTER — HOSPITAL ENCOUNTER (EMERGENCY)
Age: 88
Discharge: ANOTHER HOSPITAL | End: 2023-09-08
Attending: EMERGENCY MEDICINE
Payer: MEDICARE

## 2023-09-08 ENCOUNTER — HOSPITAL ENCOUNTER (EMERGENCY)
Dept: CT IMAGING | Age: 88
End: 2023-09-08
Payer: MEDICARE

## 2023-09-08 VITALS
OXYGEN SATURATION: 96 % | TEMPERATURE: 98.1 F | DIASTOLIC BLOOD PRESSURE: 82 MMHG | BODY MASS INDEX: 22.82 KG/M2 | HEIGHT: 62 IN | RESPIRATION RATE: 18 BRPM | HEART RATE: 68 BPM | WEIGHT: 124 LBS | SYSTOLIC BLOOD PRESSURE: 127 MMHG

## 2023-09-08 DIAGNOSIS — G93.40 ACUTE ENCEPHALOPATHY: Primary | ICD-10-CM

## 2023-09-08 DIAGNOSIS — S06.5XAA BILATERAL SUBDURAL HEMATOMAS (HCC): ICD-10-CM

## 2023-09-08 LAB
ALBUMIN SERPL-MCNC: 3.9 G/DL (ref 3.2–4.6)
ALBUMIN/GLOB SERPL: 1.1 (ref 0.4–1.6)
ALP SERPL-CCNC: 64 U/L (ref 50–130)
ALT SERPL-CCNC: 31 U/L (ref 12–65)
ANION GAP SERPL CALC-SCNC: 6 MMOL/L (ref 2–11)
APPEARANCE UR: CLEAR
APTT PPP: 20 SEC (ref 24.5–34.2)
AST SERPL-CCNC: 22 U/L (ref 15–37)
BACTERIA URNS QL MICRO: NEGATIVE /HPF
BASOPHILS # BLD: 0 K/UL (ref 0–0.2)
BASOPHILS NFR BLD: 0 % (ref 0–2)
BILIRUB SERPL-MCNC: 0.7 MG/DL (ref 0.2–1.1)
BILIRUB UR QL: NEGATIVE
BUN SERPL-MCNC: 20 MG/DL (ref 8–23)
CALCIUM SERPL-MCNC: 9.7 MG/DL (ref 8.3–10.4)
CASTS URNS QL MICRO: NORMAL /LPF
CHLORIDE SERPL-SCNC: 105 MMOL/L (ref 101–110)
CO2 SERPL-SCNC: 28 MMOL/L (ref 21–32)
COLOR UR: NORMAL
CREAT SERPL-MCNC: 0.92 MG/DL (ref 0.6–1)
DIFFERENTIAL METHOD BLD: NORMAL
EOSINOPHIL # BLD: 0.2 K/UL (ref 0–0.8)
EOSINOPHIL NFR BLD: 3 % (ref 0.5–7.8)
EPI CELLS #/AREA URNS HPF: NORMAL /HPF
ERYTHROCYTE [DISTWIDTH] IN BLOOD BY AUTOMATED COUNT: 13.2 % (ref 11.9–14.6)
GLOBULIN SER CALC-MCNC: 3.4 G/DL (ref 2.8–4.5)
GLUCOSE BLD STRIP.AUTO-MCNC: 116 MG/DL (ref 65–100)
GLUCOSE SERPL-MCNC: 129 MG/DL (ref 65–100)
GLUCOSE UR STRIP.AUTO-MCNC: NEGATIVE MG/DL
HCT VFR BLD AUTO: 41.6 % (ref 35.8–46.3)
HGB BLD-MCNC: 13.6 G/DL (ref 11.7–15.4)
HGB UR QL STRIP: NEGATIVE
IMM GRANULOCYTES # BLD AUTO: 0 K/UL (ref 0–0.5)
IMM GRANULOCYTES NFR BLD AUTO: 1 % (ref 0–5)
INR PPP: 1.1
KETONES UR QL STRIP.AUTO: NEGATIVE MG/DL
LACTATE SERPL-SCNC: 1.5 MMOL/L (ref 0.4–2)
LEUKOCYTE ESTERASE UR QL STRIP.AUTO: NEGATIVE
LYMPHOCYTES # BLD: 2.1 K/UL (ref 0.5–4.6)
LYMPHOCYTES NFR BLD: 32 % (ref 13–44)
MAGNESIUM SERPL-MCNC: 2.1 MG/DL (ref 1.8–2.4)
MCH RBC QN AUTO: 31.1 PG (ref 26.1–32.9)
MCHC RBC AUTO-ENTMCNC: 32.7 G/DL (ref 31.4–35)
MCV RBC AUTO: 95.2 FL (ref 82–102)
MONOCYTES # BLD: 0.5 K/UL (ref 0.1–1.3)
MONOCYTES NFR BLD: 7 % (ref 4–12)
MUCOUS THREADS URNS QL MICRO: 0 /LPF
NEUTS SEG # BLD: 3.6 K/UL (ref 1.7–8.2)
NEUTS SEG NFR BLD: 56 % (ref 43–78)
NITRITE UR QL STRIP.AUTO: NEGATIVE
NRBC # BLD: 0 K/UL (ref 0–0.2)
PH UR STRIP: 5 (ref 5–9)
PLATELET # BLD AUTO: 157 K/UL (ref 150–450)
PMV BLD AUTO: 9.8 FL (ref 9.4–12.3)
POTASSIUM SERPL-SCNC: 4.7 MMOL/L (ref 3.5–5.1)
PROCALCITONIN SERPL-MCNC: <0.05 NG/ML (ref 0–0.49)
PROT SERPL-MCNC: 7.3 G/DL (ref 6.3–8.2)
PROT UR STRIP-MCNC: NEGATIVE MG/DL
PROTHROMBIN TIME: 13.9 SEC (ref 12.6–14.3)
RBC # BLD AUTO: 4.37 M/UL (ref 4.05–5.2)
RBC #/AREA URNS HPF: NORMAL /HPF
SERVICE CMNT-IMP: ABNORMAL
SODIUM SERPL-SCNC: 139 MMOL/L (ref 133–143)
SP GR UR REFRACTOMETRY: 1.02 (ref 1–1.02)
URINE CULTURE IF INDICATED: NORMAL
UROBILINOGEN UR QL STRIP.AUTO: 0.2 EU/DL (ref 0.2–1)
WBC # BLD AUTO: 6.5 K/UL (ref 4.3–11.1)
WBC URNS QL MICRO: NORMAL /HPF

## 2023-09-08 PROCEDURE — 70450 CT HEAD/BRAIN W/O DYE: CPT

## 2023-09-08 PROCEDURE — 83605 ASSAY OF LACTIC ACID: CPT

## 2023-09-08 PROCEDURE — 96375 TX/PRO/DX INJ NEW DRUG ADDON: CPT

## 2023-09-08 PROCEDURE — 71045 X-RAY EXAM CHEST 1 VIEW: CPT

## 2023-09-08 PROCEDURE — 2580000003 HC RX 258: Performed by: EMERGENCY MEDICINE

## 2023-09-08 PROCEDURE — 87040 BLOOD CULTURE FOR BACTERIA: CPT

## 2023-09-08 PROCEDURE — 83735 ASSAY OF MAGNESIUM: CPT

## 2023-09-08 PROCEDURE — 99285 EMERGENCY DEPT VISIT HI MDM: CPT

## 2023-09-08 PROCEDURE — 81001 URINALYSIS AUTO W/SCOPE: CPT

## 2023-09-08 PROCEDURE — 84145 PROCALCITONIN (PCT): CPT

## 2023-09-08 PROCEDURE — 96374 THER/PROPH/DIAG INJ IV PUSH: CPT

## 2023-09-08 PROCEDURE — 96376 TX/PRO/DX INJ SAME DRUG ADON: CPT

## 2023-09-08 PROCEDURE — 82962 GLUCOSE BLOOD TEST: CPT

## 2023-09-08 PROCEDURE — 85610 PROTHROMBIN TIME: CPT

## 2023-09-08 PROCEDURE — 6360000002 HC RX W HCPCS: Performed by: EMERGENCY MEDICINE

## 2023-09-08 PROCEDURE — 85730 THROMBOPLASTIN TIME PARTIAL: CPT

## 2023-09-08 PROCEDURE — 85025 COMPLETE CBC W/AUTO DIFF WBC: CPT

## 2023-09-08 PROCEDURE — 80053 COMPREHEN METABOLIC PANEL: CPT

## 2023-09-08 RX ORDER — LORAZEPAM 2 MG/ML
1 INJECTION INTRAMUSCULAR
Status: DISCONTINUED | OUTPATIENT
Start: 2023-09-08 | End: 2023-09-08 | Stop reason: HOSPADM

## 2023-09-08 RX ORDER — MORPHINE SULFATE 4 MG/ML
4 INJECTION INTRAVENOUS
Status: DISCONTINUED | OUTPATIENT
Start: 2023-09-08 | End: 2023-09-08 | Stop reason: HOSPADM

## 2023-09-08 RX ORDER — LORAZEPAM 2 MG/ML
1 INJECTION INTRAMUSCULAR ONCE
Status: COMPLETED | OUTPATIENT
Start: 2023-09-08 | End: 2023-09-08

## 2023-09-08 RX ORDER — MORPHINE SULFATE 2 MG/ML
2 INJECTION, SOLUTION INTRAMUSCULAR; INTRAVENOUS
Status: DISCONTINUED | OUTPATIENT
Start: 2023-09-08 | End: 2023-09-08 | Stop reason: HOSPADM

## 2023-09-08 RX ORDER — 0.9 % SODIUM CHLORIDE 0.9 %
1000 INTRAVENOUS SOLUTION INTRAVENOUS ONCE
Status: COMPLETED | OUTPATIENT
Start: 2023-09-08 | End: 2023-09-08

## 2023-09-08 RX ADMIN — MORPHINE SULFATE 2 MG: 2 INJECTION, SOLUTION INTRAMUSCULAR; INTRAVENOUS at 16:02

## 2023-09-08 RX ADMIN — LORAZEPAM 1 MG: 2 INJECTION INTRAMUSCULAR; INTRAVENOUS at 17:56

## 2023-09-08 RX ADMIN — SODIUM CHLORIDE 1000 ML: 9 INJECTION, SOLUTION INTRAVENOUS at 10:50

## 2023-09-08 RX ADMIN — LORAZEPAM 1 MG: 2 INJECTION INTRAMUSCULAR; INTRAVENOUS at 14:29

## 2023-09-08 ASSESSMENT — PAIN SCALES - GENERAL: PAINLEVEL_OUTOF10: 6

## 2023-09-08 NOTE — ED NOTES
Hospice nurse spoke with family after second daughter arrived. Dr. Ladan Boudreaux at bedside to speak with family. Pt daughter want transfer to [facility] for evaluation and discussion with NSG. Wants to continue with DNR and comfort orders (ativan, morphine.) All questions answered by MD. Pt awaiting transfer. Repositioned patient and re medicated for anxiety/restless. Will continue to monitor and support patient and family.      Stevie Yang RN  09/08/23 7597

## 2023-09-08 NOTE — ED PROVIDER NOTES
Emergency Department Provider Note                   PCP:                Zev Velazco MD               Age: 80 y.o. Sex: female       ICD-10-CM    1. Acute encephalopathy  G93.40       2. Bilateral subdural hematomas (HCC)  S06. 5XAA           DISPOSITION          MDM  Number of Diagnoses or Management Options  Acute encephalopathy  Bilateral subdural hematomas (HCC)  Diagnosis management comments: MEDICAL DECISION MAKING  Complexity of Problems Addressed:  1 or more acute illnesses that pose a threat to life or bodily function. Data Reviewed and Analyzed:  Category 1:   I independently ordered and reviewed each unique test.  I reviewed external records: ED visit note from an outside group. The patients assessment required an independent historian: Daughter. The reason they were needed is  an altered level of consciousness and dementia. Category 2:   I interpreted the CT Scan Bilateral subdural hematoma, acute on chronic. Category 3: Discussion of management or test interpretation. The patient presents for change in mental status. She did have a fall 6 weeks ago. Work-up was done which showed normal urine and normal blood work. CT scan was ordered. There was some delay as our CT scanner at this facility was not operating today. The patient was transferred to [facility] and back to receive her CT scan. CT shows mixed density bilateral subdurals suggesting acute on subacute or chronic bleeding. I did consult Dr. Amber Hoang on neurosurgery. He recommended that I speak to the family about their goals. If they were interested in surgery the patient could be sent downtown for observation while her anticoagulants are being held. If they were not interested in surgery, there would be no reason to transfer. I spoke to the family and we discussed the options. They were not in favor of a surgical option and were more interested in keeping the patient comfortable.   The patient became Review of Systems    No past medical history on file. No past surgical history on file. No family history on file. Social History     Socioeconomic History    Marital status:    Tobacco Use    Smoking status: Never    Smokeless tobacco: Never         Codeine     Previous Medications    ASCORBIC ACID (VITAMIN C) 500 MG TABLET    Take by mouth    CHOLECALCIFEROL 50 MCG (2000 UT) TABS    Take by mouth    DABIGATRAN (PRADAXA) 75 MG CAPSULE    Take 75 mg by mouth 2 times daily    GUAIFENESIN (MUCINEX) 600 MG EXTENDED RELEASE TABLET    Take 600 mg by mouth 2 times daily    MAGNESIUM OXIDE (MAG-OX) 400 (240 MG) MG TABLET    Take 400 mg by mouth daily    OMEPRAZOLE (PRILOSEC OTC) 20 MG TABLET    Take 20 mg by mouth daily    PANTOPRAZOLE (PROTONIX) 40 MG TABLET    Take 40 mg by mouth daily        Vitals signs and nursing note reviewed. Patient Vitals for the past 4 hrs:   BP SpO2   09/08/23 1510 (!) 149/62 100 %   09/08/23 1440 (!) 126/46 94 %   09/08/23 1425 (!) 160/99 96 %   09/08/23 1200 (!) 144/112 98 %   09/08/23 1130 (!) 145/62 100 %          Physical Exam  HENT:      Head: Atraumatic. Mouth/Throat:      Pharynx: Oropharynx is clear. Eyes:      Extraocular Movements: Extraocular movements intact. Pupils: Pupils are equal, round, and reactive to light. Cardiovascular:      Rate and Rhythm: Normal rate and regular rhythm. Pulses: Normal pulses. Heart sounds: Normal heart sounds. Pulmonary:      Effort: Pulmonary effort is normal.      Breath sounds: Normal breath sounds. Abdominal:      General: Abdomen is flat. Bowel sounds are normal.      Palpations: Abdomen is soft. Musculoskeletal:         General: Normal range of motion. Cervical back: No tenderness. Skin:     General: Skin is warm and dry. Neurological:      Mental Status: She is alert. Comments: No obvious focal motor deficit. Patient has difficulty with transferring from chair to bed.   She

## 2023-09-08 NOTE — ED PROVIDER NOTES
Emergency Department Provider Signout / Continuation of Care Note         DISPOSITION Decision To Admit 09/08/2023 06:02:21 PM       ICD-10-CM    1. Acute encephalopathy  G93.40       2. Bilateral subdural hematomas (HCC)  S06. 5XAA           The patient's care was signed out to me at shift change. Please see Dr. Jordan Logan note for complete history and physical.  In summary patient is a 80-year-old female with dementia was found to be altered with decreased LOC today. CT head shows large subdural, acute on chronic with midline shift. Initial plan was for hospice consult for comfort measures. Patient is a DNR. After additional family arrives, family members now do not want to pursue hospice at this time, they would prefer to be admitted into get a formal neurosurgery consultation. Patient is not on Pradaxa. Patient is supposedly taking an enzyme that has anticoagulative properties per daughter. Final Plan      Admission for continued evaluation and treatment of a acute on chronic subdural hematoma. Belén Jack,   09/08/23 6598      I was called to reassess patient at approximately 10:20 PM with transport at bedside. There was concern for possible seizure-like activity. There were some twitching of upper and lower extremities as well as her lower jaw. I did perform a sternal rub and opened patient's eyes. They were not deviated to 1 side or the other. I did call patient's name and the shaking stopped, she did have a verbal response although her verbal response was not superclear. Etiology of her muscle twitching is unclear, does not appear to be a seizure as it was readily stopped with sternal rub and calling patient's name. I did discuss with EMS transport that if the patient had consistent seizure-like activity that was sustained, agree with administration of benzodiazepines.      Belén Jack,   09/08/23 5589

## 2023-09-08 NOTE — CARE COORDINATION
SW met with the patient, her son-in-law, daughter Laurie Jauregui, and  at the bedside. Patient resting soundly, most of the background information was provided by the patient's family. Patient lives with her  and has two daughters (Martha/Krupa) who live within the same neighborhood. The patient has family in and out of her home daily and stay with her nightly. Patient is insured and established with primary care (Dr. Tim Bermudez). Patient does not use assistive devices to ambulate, does not require ADL assistance, and fell six weeks ago which family reports is not typical for her. Patient's family reports that she came in today because her \"motor skills just shut down. \" The patient has been weaker than usual, fatigued, and sleeping. Daughter reports a foot drop that wasn't present prior to today and difficulty swallowing. SW talked to the family about options should the patient discharge from the ER, agreeable to a [facility] referral for PT/OT/RN and CNA. Will follow to determine dispo and arrange [facility] if not admitted. 2:50 pm: GINA met with MD, advises that family is interested in hospice services due to the patient dx (Large SDH)  and prognosis. SW met with the patient, her , daughter, and son-in-law at the bedside. Family is agreeable to meeting with hospice and potentially taking the patient home with hospice from the ER. However, they are also interested in services such as [facility]. SW provided education on GIP criteria and that patients must be screened for appropriateness. Choice provided and family has elected to meet with [facility] to discuss home and GIP services. Referral sent as urgent in Epic. Support provided. 3:00 pm: GINA called Rebecca with [facility], confirmed that the referral was received and that it is being processed.  Advised that the family is considering home with hospice vs. GIP placement, asked if a liaison would be able to meet with the patient in the ER for a presentation and intake

## 2023-09-08 NOTE — ED NOTES
PT MORE CALM AND RELAXED. VSS. FAMILY AT BEDSIDE. PT WILL OPEN EYES TO PHYSICAL STIMULUS BUT DRIFTS BACK TO SLEEP. HOSPICE BEING NOTIFIED. WILL CONTINUE TO SUPPORT PT AND FAMILY. SOCIAL WORK LAUREN SPEAKING WITH HOSPICE AND FAMILY.       Leopoldo Chesterfield, RN  09/08/23 0865

## 2023-09-08 NOTE — PROGRESS NOTES
Family met with ED MD and has decided to pursue hospital admission and aggressive interventions if appropriate. Daughter Yenny Ramirez states \"we can't just let her starve to death not knowing if she would have been able to recover from this. We have to see what can be done. \"      Thank you for this referral,   Deven Chun RN  [facility] Hospice Liaison  [phone]

## 2023-09-08 NOTE — PROGRESS NOTES
Charting on behalf of [facility]:     received call from ER nurse requesting support for patient and family, stating that patient came into ER and may be transferred to hospice.  responded to call with spiritual and emotional support.  remains available to follow up as needed.     Signed by  Ashley Connolly M.Div.

## 2023-09-08 NOTE — PROGRESS NOTES
1550-arrived at bedside to present hospice services to family. Spouse is at bedside and states he is awaiting his 2 adult daughters to arrive. Requests presentation when daughters arrive. Kelli Metzger has arrived with her  and states she is unable to make decisions for her mother without her sister Jose Antonio Cotter here, stating \"these are not decisions I can make without her. \" Jose Antonio Cotter is coming from [place]. 1725-daughter Jose Antonio Cotter has arrived to bedside. Continued hospice presentation with all family present to include hospice philosophy, payor source, levels of care, providers, and DNR/comfort directed care. Family requests to speak with ED physician again to determine prognosis and recovery from SDH. Patient has been presented to hospice provider and is approved for [facility] in her home. Awaiting family decision.      Thank you for this referral,   Minus MODE Snow  [facility] Hospice Liaison  [phone]

## 2023-09-08 NOTE — PROGRESS NOTES
NSR Contact Note    Called by [facility] ED for 89y/o F with subacute worsening of baseline mental status. Reportedly with baseline dementia but has been more somnolent and less active compared to baseline. Sent to ED by PCP and found to have large L > R sa on cSDH. Pt on Pradaxa per chart review, family not at bedside for confirmation per ED report. Imaging reviewed - large L FTP sa on cSDH with likely membrane formation. Smaller R convexity collection with 4mm left to right midline shift with patent basilar cisterns, but effaced cortical sulci. No emergent NSR intervention warranted at this time. Pt does have sizeable subacute on chronic SDH that would be amendable to arin hole vs minicramiotomy evacuation, though given pt's baseline dementia and age, have recommended discussion of desires for cranial surgery with family. If they would like to consider surgery, recommend transfer to [facility] to medicine service with q2hr neuro checks. Would need medical evaluation for fitness for surgery and would need to hold antiplt/anticoagulant medications. Given pradaxa use, high risk for rebleed at this time, and would plan to hold medication to 'washout' until early this coming week, unless pt with acute neurologic decline, which is not anticipated. NSR happy to follow along and discuss with pt/family should they like to proceed with admission and discussion of surgery.

## 2023-09-09 PROBLEM — F03.90 DEMENTIA (HCC): Status: ACTIVE | Noted: 2023-01-01

## 2023-09-09 PROBLEM — I10 PRIMARY HYPERTENSION: Status: ACTIVE | Noted: 2023-01-01

## 2023-09-09 PROBLEM — I48.21 PERMANENT ATRIAL FIBRILLATION (HCC): Status: ACTIVE | Noted: 2023-01-01

## 2023-09-09 NOTE — PROGRESS NOTES
Oxygen            GROSS EVALUATION: INTACT IMPAIRED   (See Comments)   UE AROM [] []Unble to assess secondary to no command follow or intelligible response   UE PROM [] []WFL   Strength []  Unble to assess secondary to no command follow or intelligible response     Posture / Balance [] Sitting - Static: Poor  Sitting - Dynamic: Poor  Standing - Static: Poor  Standing - Dynamic: Poor   Sensation []  Unble to assess secondary to no command follow or intelligible response   Coordination []  Globally decreased     Tone []  BUE present flaccid, RUE present with greater rate of drop when attempting to have pt hold arm just above bed level     Edema [] N/A   Activity Tolerance [] Treatment limited secondary to decreased cognition     Hand Dominance R [] L []      COGNITION/  PERCEPTION: INTACT IMPAIRED   (See Comments)   Orientation []  Unble to assess secondary to no command follow or intelligible response   Vision []  Unble to assess secondary to no command follow or intelligible response   Hearing []  Unble to assess secondary to no command follow or intelligible response   Cognition  []  Global deficits with inability to follow 1-step commands or offer verbal response beyond \"yes\" intermittently    Perception []       MOBILITY: I Mod I S SBA CGA Min Mod Max Total  NT x2 Comments:   Bed Mobility    Rolling [] [] [] [] [] [] [] [x] [] [] [x]    Supine to Sit [] [] [] [] [] [] [] [x] [] [] [x]    Scooting [] [] [] [] [] [] [] [x] [] [] [x]    Sit to Supine [] [] [] [] [] [] [] [x] [] [] [x]    Transfers    Sit to Stand [] [] [] [] [] [] [] [] [x] [] [x] Attempt, unsucessful   Bed to Chair [] [] [] [] [] [] [] [] [] [x] []    Stand to Sit [] [] [] [] [] [] [] [] [] [x] []    Tub/Shower [] [] [] [] [] [] [] [] [] [x] []     Toilet [] [] [] [] [] [] [] [] [] [x] []      [] [] [] [] [] [] [] [] [] [] []    I=Independent, Mod I=Modified Independent, S=Supervision/Setup, SBA=Standby Assistance, CGA=Contact Guard Assistance, Min=Minimal Assistance, Mod=Moderate Assistance, Max=Maximal Assistance, Total=Total Assistance, NT=Not Tested    ACTIVITIES OF DAILY LIVING: I Mod I S SBA CGA Min Mod Max Total NT Comments   BASIC ADLs:              Upper Body Bathing  [] [] [] [] [] [] [] [x] [] []     Lower Body Bathing [] [] [] [] [] [] [] [] [x] []     Toileting [] [] [] [] [] [] [] [] [x] []    Upper Body Dressing [] [] [] [] [] [] [] [x] [] []    Lower Body Dressing [] [] [] [] [] [] [] [] [x] []    Feeding [] [] [] [] [] [] [] [x] [] []    Grooming [] [] [] [] [] [] [] [x] [] [] Hand-over-hand face washing with increased cues and time for comprehension   Personal Device Care [] [] [] [] [] [] [] [] [] [x]    Functional Mobility [] [] [] [] [] [] [] [x] [] [] X2 bed mobility and attempted sit<>stand x 1   I=Independent, Mod I=Modified Independent, S=Supervision/Setup, SBA=Standby Assistance, CGA=Contact Guard Assistance, Min=Minimal Assistance, Mod=Moderate Assistance, Max=Maximal Assistance, Total=Total Assistance, NT=Not Tested    PLAN:   FREQUENCY/DURATION   OT Plan of Care: 1 time/week for duration of hospital stay or until stated goals are met, whichever comes first.    PROBLEM LIST:   (Skilled intervention is medically necessary to address:)  Decreased ADL/Functional Activities  Decreased Activity Tolerance  Decreased AROM/PROM  Decreased Balance  Decreased Cognition  Decreased Coordination  Decreased Gait Ability  Decreased Safety Awareness  Decreased Strength  Decreased Transfer Abilities   INTERVENTIONS PLANNED:  (Benefits and precautions of occupational therapy have been discussed with the patient.)  Self Care Training  Therapeutic Activity  Therapeutic Exercise/HEP  Neuromuscular Re-education  Manual Therapy  Education         TREATMENT:     EVALUATION: HIGH COMPLEXITY: (Untimed Charge)    TREATMENT:   Co-Treatment PT/OT necessary due to patient's decreased overall endurance/tolerance levels, as well as need for high

## 2023-09-09 NOTE — CONSULTS
[address] [facility] Neurology [place] [address] Deborah Wooten is a 80 y.o. female who presents on referral from the hospitalist service patient has a longstanding history of atrial fibrillation prior usage of anticoagulation which was discontinued 6 months ago a longstanding history of dementia admitted to the hospital with worsening confusion and somnolence ascertained to have a substantial left-sided subdural hematoma. Past Medical History:   Diagnosis Date    Atrial fibrillation ([address] )     Dementia ([facility])     Dementia ([address] [facility]) 9/9/2023    Diabetes mellitus ([address] [facility])     Hypertension     Permanent atrial fibrillation ([address] [facility]) 9/9/2023    Primary hypertension 9/9/2023    Thyroid disease        Past Surgical History:   Procedure Laterality Date    CHOLECYSTECTOMY      HYSTERECTOMY (CERVIX STATUS UNKNOWN)          No family history on file.      Social History     Socioeconomic History    Marital status:    Tobacco Use    Smoking status: Never    Smokeless tobacco: Never         Current Facility-Administered Medications   Medication Dose Route Frequency Provider Last Rate Last Admin    guaiFENesin (MUCINEX) extended release tablet 600 mg  600 mg Oral BID Aly Julian MD        magnesium oxide (MAG-OX) tablet 400 mg  400 mg Oral Daily Aly Julian MD        pantoprazole (PROTONIX) tablet 40 mg  40 mg Oral QAM AC Aly Julian MD        sodium chloride flush 0.9 % injection 5-40 mL  5-40 mL IntraVENous 2 times per day Aly Julian MD   10 mL at 09/09/23 0948    sodium chloride flush 0.9 % injection 5-40 mL  5-40 mL IntraVENous PRN Aly Julian MD        0.9 % sodium chloride infusion   IntraVENous PRN Aly Julian MD        acetaminophen (TYLENOL) tablet 650 mg  650 mg Oral Q4H PRN Aly Julian MD        ondansetron (ZOFRAN-ODT) disintegrating tablet 4 mg  4 mg Oral Q8H PRN Aly Julina MD        Or    ondansetron [facility]) injection 4 mg  4 mg IntraVENous Q6H

## 2023-09-09 NOTE — H&P
[facility] Hospitalist Initial History and Physical Note    Patient: Jordy Comer Date: 9/9/2023  female, 80 y.o. Admit Date: 9/8/2023  Attending: Dominic John MD;Just*     ASSESSMENT AND PLAN:     Principal Problem:    SDH (subdural hematoma) ([address])  Plan: Evident on CT. Neurosurgery reviewed images, does not recommend intervention. Patient's daughter would like full neurosurgery evaluation. Hold anticoagulants. Admit to ICU at [facility]. Repeat CT in AM. Formal neurosurgery consult. Dementia  Plan: Stable. DVT Prophylaxis: SCDs, pt not on Pradaxa any more per family. Code Status: DNR      Disposition: Admit to ICU at [facility] for evaluation and treatment as per above. Anticipated discharge: 2-3 days     CHIEF COMPLAINT:  confusion    HISTORY OF PRESENT ILLNESS:      Patient Active Problem List   Diagnosis    SDH (subdural hematoma) (HCC)       Jordy Comer is a 80 y.o. female, with a history of  has a past medical history of Atrial fibrillation ([address]), Dementia ([address]), Diabetes mellitus ([address]), Hypertension, and Thyroid disease.,  has a past surgical history that includes Cholecystectomy and Hysterectomy. who presents to the ER with report of worsening confusion and somnolence. Patient is unable to provide additional history.     Allergy  Allergies   Allergen Reactions    Codeine Hives and Swelling       Medication list  Medications Prior to Admission: ascorbic acid (VITAMIN C) 500 MG tablet, Take by mouth  Cholecalciferol 50 MCG (2000 UT) TABS, Take by mouth  dabigatran (PRADAXA) 75 MG capsule, Take 75 mg by mouth 2 times daily (Patient not taking: Reported on 9/8/2023)  guaiFENesin (MUCINEX) 600 MG extended release tablet, Take 600 mg by mouth 2 times daily  magnesium oxide (MAG-OX) 400 (240 Mg) MG tablet, Take 400 mg by mouth daily  omeprazole (PRILOSEC OTC) 20 MG tablet, Take 20 mg by mouth daily  pantoprazole (PROTONIX) 40 MG tablet, Take 40 mg by mouth daily     Past

## 2023-09-09 NOTE — PROGRESS NOTES
LTG: patient will tolerate safest, least restrictive oral diet without s/sx airway compromise    STG: Patient will tolerate ongoing po trials in efforts to advance diet  STG: Patient will participate in instrumental swallowing assessment to objectively assess oropharyngeal swallow if clinically indicated      SPEECH LANGUAGE PATHOLOGY: DYSPHAGIA  Initial Assessment    NAME: Jordy Landers  : 1932  MRN: [id number]    ADMISSION DATE: 2023  PRIMARY DIAGNOSIS: SDH (subdural hematoma) (HCC)  SDH (subdural hematoma) ([address]) [S06. 5XAA]    ICD-10: Treatment Diagnosis: R13.12 Dysphagia, Oropharyngeal Phase    RECOMMENDATIONS   Diet:  Diet Solids Recommendation: NPO  Liquid Consistency Recommendation: Ice chips after oral care (2-3 per hour)    Medications: Crushed in puree as able     Compensatory Swallowing Strategies: Upright as possible for all oral intake;Remain upright for 30-45 minutes after meals   Recommendations: Dysphagia treatment; Modified barium swallow study (cognitive linguistic evaluation)    Therapeutic Interventions: Patient/Family education; Therapeutic PO trials with SLP   Patient continues to require skilled intervention:  Yes. Recommend ongoing speech therapy services during this hospitalization and next level of care      ASSESSMENT    Dysphagia Diagnosis: Moderate pharyngeal stage dysphagia;Mild to moderate oral stage dysphagia  Dysphagia Impression : Patient required max prompting to attend to task. [place] even with hearing aid in (placed by daughter). Eyes remained closed other than fluttering intermittently when spoken to by daughter. Did respond \"yes\" to \"are you thirsty\" and \"do you want more. \" Also opened mouth to indicate desire for additional applesauce presentation. Quickly fatigued and if not stimulated, quickly back asleep. Ice chips and tsp presentations of puree without overt s/sx. Further trials deferred due to increasing delay.  Recommend NPO with 2-3 ice chips/hour if with fatigue  Pharyngeal Phase: Exceptions    Dysphagia Outcome and Severity Scale (TEAGAN)  Dysphagia Outcome Severity Scale: Level 3: Moderate dysphagia- Total assistance, supervision or strategies. Two or more diet consistencies restricted  Interpretation of Tool: The Dysphagia Outcome and Severity Scale (TEAGAN) is a simple, easy-to-use, 7-point scale developed to systematically rate the functional severity of dysphagia based on objective assessment and make recommendations for diet level, independence level, and type of nutrition. Normal(7), Functional(6), Mild(5), Mild-Moderate(4), Moderate(3), Moderate-Severe(2), Severe(1)    PLAN    Duration/Frequency: Continue to follow patient 3x/week for duration of hospitalization and/or until goals met    Education: Patient, RN, Family member  Patient Education: SLP role, diet recommendations  Patient Education Response: No evidence of learning    PRECAUTIONS/ALLERGIES: Codeine        Therapy Time  SLP Individual Minutes  Time In: 8072  Time Out: 1127  Minutes: Kirill Erickson.  Ranjeet Candelaria MS, CCC-SLP         Speech Language Pathologist          Acute Rehabilitation Services                   Contact: [facility]  9/9/2023 11:43 AM

## 2023-09-09 NOTE — PLAN OF CARE
Problem: Discharge Planning  Goal: Discharge to home or other facility with appropriate resources  Outcome: Progressing     Problem: Pain  Goal: Verbalizes/displays adequate comfort level or baseline comfort level  Outcome: Progressing     Problem: Safety - Adult  Goal: Free from fall injury  Outcome: Progressing     Problem: Neurosensory - Adult  Goal: Achieves stable or improved neurological status  Outcome: Progressing     Problem: Skin/Tissue Integrity - Adult  Goal: Skin integrity remains intact  Outcome: Progressing     Problem: Infection - Adult  Goal: Absence of infection at discharge  Outcome: Progressing  Goal: Absence of infection during hospitalization  Outcome: Progressing

## 2023-09-09 NOTE — ED NOTES
TRANSFER - OUT REPORT:    Verbal report given to [facility] on Jordy Comer  being transferred to [facility] [id number] for routine progression of patient care       Report consisted of patient's Situation, Background, Assessment and   Recommendations(SBAR). Information from the following report(s) ED SBAR was reviewed with the receiving nurse. Lines:   Peripheral IV 09/08/23 Left Antecubital (Active)   Site Assessment Clean, dry & intact; Clean 09/08/23 1040   Line Status Blood return noted; Flushed;Normal saline locked;Specimen collected 09/08/23 1040   Phlebitis Assessment No symptoms 09/08/23 1040   Infiltration Assessment 0 09/08/23 1040   Dressing Status New dressing applied;Clean, dry & intact 09/08/23 1040   Dressing Type Transparent 09/08/23 1040       Peripheral IV 09/08/23 Left Forearm (Active)   Site Assessment Clean, dry & intact; Clean 09/08/23 1054   Line Status Blood return noted; Flushed;Normal saline locked;Specimen collected 09/08/23 1054   Phlebitis Assessment No symptoms 09/08/23 1054   Infiltration Assessment 0 09/08/23 1054   Dressing Status New dressing applied;Clean, dry & intact 09/08/23 1054   Dressing Type Transparent 09/08/23 1054        Opportunity for questions and clarification was provided.       Patient transported with:  Registered Nurse      Samantha Cuellar RN  09/08/23 4196

## 2023-09-09 NOTE — PROGRESS NOTES
TRANSFER - IN REPORT:    Verbal report received from MODE Kilpatrick on [employer]  being received from [facility] ED for routine progression of patient care      Report consisted of patient's Situation, Background, Assessment and   Recommendations(SBAR). Information from the following report(s) ED SBAR was reviewed with the receiving nurse. Opportunity for questions and clarification was provided. Assessment completed upon patient's arrival to unit and care assumed.

## 2023-09-09 NOTE — PROGRESS NOTES
ACUTE PHYSICAL THERAPY GOALS:   (Developed with and agreed upon by patient and/or caregiver.)  STG:  (1.)Ms. Tameka Squires will move from supine to sit and sit to supine , scoot up and down, and roll side to side with MODERATE ASSIST within 4 treatment day(s). (2.)Ms. Tameka Squires will transfer from bed to chair and chair to bed with MODERATE ASSIST using the least restrictive device within 4 treatment day(s). (3.)Ms. Tameka Squires will ambulate with MODERATE ASSIST for 10 feet with the least restrictive device within 4 treatment day(s). LTG:  (1.)Ms. Tameka Squires will move from supine to sit and sit to supine , scoot up and down, and roll side to side in bed with MINIMAL ASSIST within 7 treatment day(s). (2.)Ms. Tameka Squires will transfer from bed to chair and chair to bed with MINIMAL ASSIST using the least restrictive device within 7 treatment day(s). (3.)Ms. Tameka Squires will ambulate with MINIMAL ASSIST for 25 feet with the least restrictive device within 7 treatment day(s). ________________________________________________________________________________________________     PHYSICAL THERAPY Initial Assessment and PM  (Link to Caseload Tracking: PT Visit Days : 1  Acknowledge Orders  Time In/Out  PT Charge Capture  Rehab Caseload Tracker    Jordy Iglesias is a 80 y.o. female   PRIMARY DIAGNOSIS: SDH (subdural hematoma) (HCC)  SDH (subdural hematoma) ([address]) [S06. 5XAA]       Reason for Referral: Other lack of cordination (R27.8)  Difficulty in walking, Not elsewhere classified (R26.2)  Other abnormalities of gait and mobility (R26.89)  Ataxic gait (R26.0)  History of falling (Z91.81)  Unspecified Lack of Coordination (R27.9)  Abnormal posture (R29.3)  Inpatient: Payor: [address] / Plan: Alison Hanson PPO / Product Type: Medicare /     ASSESSMENT:     REHAB RECOMMENDATIONS:   Recommendation to date pending progress:  Setting:  Short-term Rehab    Equipment:    To Be Determined     ASSESSMENT:  Ms. Tameka Squires is

## 2023-09-10 LAB
BACTERIA SPEC CULT: NORMAL
BACTERIA SPEC CULT: NORMAL
SERVICE CMNT-IMP: NORMAL
SERVICE CMNT-IMP: NORMAL

## 2023-09-10 NOTE — PROGRESS NOTES
NSR Update Note    Bethel De Leon, pt's daughter, this AM to discuss pt. Pt with baseline dementia, has not been taking pradaxa, but has been given a 'natural supplement that is supposed to thin her blood'. Discussed CT findings and evidence of SDH with potential membranes. Briefly discussed surgery with arin hole craniectomy vs minicraniotomy. Pt still undergoing medicine workup, but should pt be a candidate for surgery, pt's daughter has expressed their desire to move forward and 'do everything possible.'  Will notify primary [facility] NSR team, Dr. Lazarus Abe in AM, and will make pt NPO at [facility] for potential surgery. Appreciate medicine assistance in evaluating pt for fitness for anesthesia/OR.

## 2023-09-10 NOTE — PROGRESS NOTES
LTG: patient will tolerate safest, least restrictive oral diet without s/sx airway compromise    STG: Patient will tolerate ongoing po trials in efforts to advance diet  STG: Patient will participate in instrumental swallowing assessment to objectively assess oropharyngeal swallow if clinically indicated      SPEECH LANGUAGE PATHOLOGY: DYSPHAGIA  Daily Note #1    NAME: Jordy Rahman  : 1932  MRN: [id number]    ADMISSION DATE: 2023  PRIMARY DIAGNOSIS: SDH (subdural hematoma) (HCC)  SDH (subdural hematoma) ([address]) [S06. 5XAA]    ICD-10: Treatment Diagnosis: R13.12 Dysphagia, Oropharyngeal Phase    RECOMMENDATIONS   Diet:  Diet Solids Recommendation: NPO  Liquid Consistency Recommendation: Ice chips after oral care (2-3 per hour)    Medications: Crushed in puree as able (and/or via alternative means of nutrition if available)     Compensatory Swallowing Strategies: Upright as possible for all oral intake;Remain upright for 30-45 minutes after meals   Recommendations: Dysphagia treatment; Modified barium swallow study (cognitive lingusitic evaluation)    Therapeutic Interventions: Patient/Family education; Therapeutic PO trials with SLP   Patient continues to require skilled intervention:  Yes. Recommend ongoing speech therapy services during this hospitalization and next level of care      ASSESSMENT    Dysphagia Diagnosis: Moderate pharyngeal stage dysphagia;Mild to moderate oral stage dysphagia  Dysphagia Impression : Patient required max prompting to attend to task. Less acceptance overall (less opening of mouth to spoon). Did accept ice chip, applesauce x2 presentations, tsp water x2 and single sip from straw of water. No overt s/sx with any/all presented consistencies. Increasing oral holding with presentations and less interactive. Fidgeting and moving down in the bed. Quickly fatigued and if not stimulated, and back asleep. Recommend NPO with 2-3 ice chips/hour if alert/upright/desiring.  Medication

## 2023-09-10 NOTE — PROGRESS NOTES
MD informed of SBP remaining high despite PRN medication being given and patient being restless and uncomfortable.  Orders received

## 2023-09-10 NOTE — PROGRESS NOTES
[facility] CARDIOLOGY PROGRESS NOTE           9/10/2023 11:40 AM    Admit Date: 9/8/2023      Subjective:   Remains unresponsive to verbal commands. No family members at bedside    ROS:Not obtainable. GEN:  No fever or chills  Cardiovascular:  As noted above  Pulmonary:  As noted above  Neuro:  No new focal motor or sensory loss    Objective:      Vitals:    09/10/23 0845 09/10/23 0900 09/10/23 0930 09/10/23 1000   BP:  (!) 140/64 138/65 (!) 141/59   Pulse:  (!) 122 (!) 128 95   Resp:  29 25 23   Temp:       TempSrc:       SpO2:  97% 96% 95%   Weight: 128 lb (58.1 kg)      Height: 5' 2\" (1.575 m)          Physical Exam:  General-NAD  Neck- supple, no JVD  CV- irregular rate and rhythm with 1/LISETTE  Lung- clear bilaterally  Abd- soft, nontender, nondistended  Ext- no edema bilaterally. Skin- warm and dry  Psychiatric:  unresponsive to verbal stimuli  Neurologic:  unresponsive to verbal stimuli      Data Review:   Recent Labs     09/08/23  1035 09/08/23  1818 09/09/23  0303 09/10/23  0331     --  142 143   K 4.7  --  4.6 3.4*   MG 2.1  --   --   --    BUN 20  --  16 16   WBC 6.5  --  6.7 9.0   HGB 13.6  --  12.6 12.5   HCT 41.6  --  38.9 38.1     --  141* 165   INR  --  1.1  --   --        TELEMETRY:  AF in th 80's    Assessment/Plan:     Principal Problem:    SDH (subdural hematoma) (HCC):per Neurosurgery  Active Problems:    Permanent atrial fibrillation (HCC):HR is controlled. Not on [address] Echo showed normal LV EF ,HARRY,and mild AR &MR.     Dementia ([address])    Primary hypertension:Stable on Hydralazine and Labetalol prelizabeth Steward MD  9/10/2023 11:40 AM

## 2023-09-10 NOTE — PROGRESS NOTES
NSR Progress Note    S: No acute events overnight, getting cardiac echo this AM.  Reports of pt family interested in surgery and report pt has not been on pradaxa. O:  Vitals:    09/10/23 0700   BP: (!) 141/66   Pulse: 86   Resp: 29   Temp:    SpO2: 97%     Drowsy, easily arousable to light stim, EO to light stim  EOM spont, pupils 2mm B  Briskly localizing BU/LE, moving L purposefully, more than R  SI to light stim  Nonverbal    A/P: Pt is a 80 y.o. y/o female with L>>R FTP convexity sa  on cSDH with mass effect and 4mm MLS.  - Neuro exam stable, improved from yesterday's exam, but still with R more sluggish than L.  - Report pt has not been on pradaxa or other antiplt/anticoagulant medications. Medicine working on medical optimization, currently undergoing cardiac echo this AM.  - Report of family desiring surgical intervention. Not present today at bedside, will call them to discuss. No emergent intervention at this time, but if family desires surgery and pt is medically fit per medicine/cardiology review, will discuss with Dr. Morena Epstein upon his return tomorrow for timing.  - Cont supportive care per primary team, dispo pending family discussion and medicine workup. NPO after MN in event plan to move forward with OR.           Current Facility-Administered Medications:     labetalol (NORMODYNE;TRANDATE) injection 10 mg, 10 mg, IntraVENous, Q2H PRN, La Valle MD, 10 mg at 09/10/23 0614    pantoprazole (PROTONIX) 40 mg in sodium chloride (PF) 0.9 % 10 mL injection, 40 mg, IntraVENous, Daily, Kelby Álvarez MD, 40 mg at 09/10/23 0746    acetaminophen (TYLENOL) suppository 650 mg, 650 mg, Rectal, Q4H PRN, Kelby Álvarez MD, 650 mg at 09/09/23 2256    [facility]in [facility]) extended release tablet 600 mg, 600 mg, Oral, BID, Lashaun Wakefield MD    magnesium oxide (MAG-OX) tablet 400 mg, 400 mg, Oral, Daily, Lashaun Wakefield MD, 400 mg at 09/10/23 0740    sodium chloride flush 0.9 % injection 5-40 mL, 5-40

## 2023-09-10 NOTE — CONSULTS
the patient in regard to her chronic atrial fibrillation as well as for preop assessment if future neurosurgery may be indicated. ADDITIONAL PAST MEDICAL HISTORY:  Obtained from the patient's daughter;  1. Hypertension. 2.  History of hypothyroidism. 3.  Chronic permanent atrial fibrillation. 4.  Moderate dementia. 5.  Distant surgical history of cholecystectomy. ALLERGIES:  CODEINE. SOCIAL HISTORY:  The patient lives with her  at home. Apparently, she is fairly independent. The daughter states she is able to go shopping sometimes with her two daughters with assistance. No history of alcohol or tobacco use. FAMILY HISTORY:  The patient's daughter states that she does not know her father. She says her mother  due to complications of uterine cancer. CURRENT MEDICATIONS IN THE CCU:  Mucinex 600 mg daily, Keppra 500 mg IV q.12 hours, magnesium oxide 400 mg daily, Protonix 40 mg daily. REVIEW OF SYSTEMS:  Please see history of present illness. Review of systems is unobtainable from the patient. PHYSICAL EXAMINATION:  VITAL SIGNS:  Blood pressure is 150/80. Her heart rate is 72 and irregular. Her respirations are 16 and unlabored. GENERAL:  The patient is a frail elderly  female who is lying in the CCU. She does not appear to be in any overt distress. She does not open her eyes or follow any verbal commands during the examination. She does not participate or answer my questions during the interview. HEENT:  Normocephalic. She does have a healing small laceration over her right eye. NECK:  Supple. I could not appreciate any significant JVD. Carotid upstrokes were 2+ and brisk. I did not hear any audible bruits. CARDIAC:  Reveals an irregularly irregular rate and rhythm without significant murmur, rub or gallop. ABDOMEN:  Soft, nondistended without masses. EXTREMITIES:  Show no significant edema.   Pulses were 1 to 2+ bilateral and equal.    AVAILABLE LAB

## 2023-09-11 PROBLEM — S06.5XAA SUBDURAL HEMATOMA (HCC): Status: ACTIVE | Noted: 2023-01-01

## 2023-09-11 NOTE — ACP (ADVANCE CARE PLANNING)
Advance Care Planning     Advance Care Planning Activator (Inpatient)  Conversation Note      Date of ACP Conversation: 9/11/2023       ACP Activator: Warren Claire RN    {When Decision Maker makes decisions on behalf of the incapacitated patient: Decision Maker is asked to consider and make decisions based on patient values, known preferences, or best interests. Health Care Decision Maker: No current LW/HCPOA on file, but family does have one they state and Alanna Chan and Ken listed. They understand that spouse is legal NOK unless document presented. Current Designated Health Care Decision MakerWildjarod Link -[phone]/Martha [phone]. Click here to complete Healthcare Decision Makers including section of the Healthcare Decision Maker Relationship (ie \"Primary\")  Today we documented Decision Maker(s) consistent with Legal Next of Kin hierarchy.

## 2023-09-11 NOTE — PROGRESS NOTES
SPEECH PATHOLOGY NOTE:    Per chart review, patient in NPO for possible procedure. Will follow up at later time/date.       Shahid Vázquez MS, CCC-SLP

## 2023-09-11 NOTE — INTERDISCIPLINARY ROUNDS
Multi-D Rounds/Checklist (leapfrog):  Lines: can any be removed?: None      DVT Prophylaxis: Ordered  Vent: N/A  Nutrition Ordered/appropriate: Per Primary Team  Can antibiotics or other drugs be stopped? N/A Yes/No  Inpat Anti-Infectives (From admission, onward)      None          Consults needed: None  A: Is pain control adequate? (has PRNs? Stop drip?) Yes  B: Sedation break and SBT? N/A  C: Is sedation choice appropriate? N/A  D: Delirium/CAM-ICU? Yes  E: Mobility goals/appropriateness? N/A  F: Family update and plan? Daughter is surrogate decision maker and is being updated daily by primary attending and nursing staff.     Gayleen Cushing, PA

## 2023-09-11 NOTE — PLAN OF CARE
Problem: Discharge Planning  Goal: Discharge to home or other facility with appropriate resources  Outcome: Progressing     Problem: Pain  Goal: Verbalizes/displays adequate comfort level or baseline comfort level  Outcome: Progressing     Problem: Safety - Adult  Goal: Free from fall injury  Outcome: Progressing     Problem: Neurosensory - Adult  Goal: Achieves stable or improved neurological status  Outcome: Progressing     Problem: Skin/Tissue Integrity - Adult  Goal: Skin integrity remains intact  Outcome: Progressing     Problem: Infection - Adult  Goal: Absence of infection at discharge  Outcome: Progressing  Goal: Absence of infection during hospitalization  Outcome: Progressing     Problem: Chronic Conditions and Co-morbidities  Goal: Patient's chronic conditions and co-morbidity symptoms are monitored and maintained or improved  Outcome: Progressing     Problem: Confusion  Goal: Confusion, delirium, dementia, or psychosis is improved or at baseline  Outcome: Progressing

## 2023-09-11 NOTE — CARE COORDINATION
Chart reviewed and to see pt and family. Family confirms demographics/screen. Pt lives with spouse. Daughters are in and out all day. Pt has 3 daughter, one out of town. No current DME for ambulation, HH or rehab. Family does all cooking and household chores, she tries to assist. Family provides transport. Does have Dementia per children/spouse. Family attempting to decide goals for care to have surgery or not per Neurosurgery. Discussed post op and poss d/c needs. Briefly discussed hospice house and GIP criteria, as daughter stating at current state they could not care for her at home. Options discussed for agencies/SNF [facility] providers. Aware CM to follow and they have CM number. Will continue to follow. 09/11/23 9650   Service Assessment   Patient Orientation Unable to Assess   Cognition Other (see comment)  (unresponsive)   History Provided By Child/Family; Spouse   Primary Caregiver Self   Accompanied By/Relationship spouse, daughter, son   Support Systems Spouse/Significant Other;Children;Family Members   Patient's Healthcare Decision Maker is: Named in [facility]   PCP Verified by CM Yes   Prior Functional Level Independent in ADLs/IADLs   Current Functional Level Assistance with the following:   Can patient return to prior living arrangement Unknown at present   Ability to make needs known: Unable   Family able to assist with home care needs: No   Would you like for me to discuss the discharge plan with any other family members/significant others, and if so, who?  Yes   Financial Resources Medicare [facility]   Community Resources None   CM/SW Referral Other (see comment)  (pending)   Social/Functional History   Lives With Spouse   Type of [facility] [address] One level   Home Equipment None   Receives Help From Family   ADL Assistance Independent   Homemaking Assistance Needs assistance   Homemaking Responsibilities No   Ambulation Assistance Independent   Transfer Assistance

## 2023-09-11 NOTE — PROGRESS NOTES
had visited with patient and family at [address] [facility]    Today staff is talking with family at the bedside

## 2023-09-11 NOTE — PROGRESS NOTES
[facility] CARDIOLOGY PROGRESS NOTE           9/11/2023 11:28 AM    Admit Date: 9/8/2023         Subjective: Remains obtunded, HR has increased to over 100. ROS:  Could not complete    Objective:      Vitals:    09/11/23 0900 09/11/23 0915 09/11/23 0930 09/11/23 0945   BP: (!) 144/65 (!) 145/59 (!) 141/63    Pulse: (!) 101 (!) 136 (!) 113 (!) 108   Resp: (!) 33 (!) 33 (!) 42 27   Temp:       TempSrc:       SpO2: 95% 95% 94% 93%   Weight:       Height:           On telemetry:afib      Physical Exam:  General: NAD not responding to ROS questions  Neck: supple, no JVD  Heart: irreg irreg  Lungs: Clear throughout auscultation bilaterally without adventitious sounds  Abd: soft, nontender, nondistended, with good bowel sounds  Ext: no edema bilaterally  Skin: warm and dry      Data Review:   Recent Labs     09/08/23  1818 09/09/23  0303 09/10/23  0331 09/11/23  0330   NA  --    < > 143 143   K  --    < > 3.4* 4.3   BUN  --    < > 16 14   WBC  --    < > 9.0 10.9   HGB  --    < > 12.5 11.3*   HCT  --    < > 38.1 34.0*   PLT  --    < > 165 144*   INR 1.1  --   --   --     < > = values in this interval not displayed. No results for input(s): \"TNIPOC\" in the last 72 hours. Invalid input(s): \"TROIQ\"      Assessment/Plan:     Principal Problem:    SDH (subdural hematoma) (HCC)  Active Problems:    Permanent atrial fibrillation (HCC)    Dementia (HCC)    Primary hypertension    Subdural hematoma (HCC)  Resolved Problems:    * No resolved hospital problems.  *    A/P  1) Afib -we will order a diltiazem drip that can be started if she sustained over 120 bpm.  2) subdural hematoma - cannot start anticoagulation due to this  3) HTN - on cardiene gtt controlled    Kayode Olson MD  9/11/2023 11:28 AM

## 2023-09-11 NOTE — PROGRESS NOTES
Occupational Therapy Note:    Attempted to see patient this AM for physical therapy treatment  session. Patient was held today as per RN plan for craniotomy tomorrow. Pt will need re-evaluation following procedure. Will follow and re-attempt as schedule permits/patient available.  Thank you,    Dangelo Garcia, OT    Rehab Caseload Tracker

## 2023-09-11 NOTE — PROGRESS NOTES
Contacted cardiology due to patient's chronic afib with rate varying between 120's and 130's. Cardiology does not want to treat unless HR is sustained above 140.

## 2023-09-11 NOTE — PROGRESS NOTES
Physical Therapy Note:    Attempted to see patient this AM for physical therapy treatment  session. Patient was held today as per RN plan for craniotomy tomorrow. Pt will need re-evaluation following procedure. Will follow and re-attempt as schedule permits/patient available. Thank you,    Zoey Gil.  Etta Ferrell     Rehab Caseload Tracker

## 2023-09-12 NOTE — PROGRESS NOTES
Hospice liaison meeting with family and spouse at 9:30 am to discuss GIP care at [facility].     Thank you for this referral,  Daisy Bautista RN, [facility]  [phone] C

## 2023-09-12 NOTE — PROGRESS NOTES
Occupational Therapy Note:    Attempted to see patient this AM for occupational therapy treatment  session. Patient going for craniotomy today. Will HOLD, follow and re-attempt as schedule permits/patient available.  Thank you,    Palomo Urrutia, OT    Rehab Caseload Tracker

## 2023-09-12 NOTE — PROGRESS NOTES
Spoke to the pt's daughter, Concepcion De La Rosa. After speaking with her family and the pt's PCP, the family has decided to not move forward with the craniotomy today. NSG made aware. Further decisions about her care will be made once they arrive at the bedside.

## 2023-09-12 NOTE — PROGRESS NOTES
Liaison met with patient and with several members at bedside to discuss hospice philosophy of care, hospice team members, and frequency of visits. Patient's spouse was not present and had gone home to get some rest. (Per daughter). We also discussed the [facility] for general inpatient care with symptom management and respite care. Liaison answered all questions appropriately. Family expressed interest in [place] [facility] and if possible with room and board fee.     Thank you for this referral,  Teola Prader, VASILIYN, RN  [facility]  [phone] C

## 2023-09-12 NOTE — PLAN OF CARE
Problem: Discharge Planning  Goal: Discharge to home or other facility with appropriate resources  Recent Flowsheet Documentation  Taken 9/11/2023 1900 by Jhoan Smalls RN  Discharge to home or other facility with appropriate resources: Identify barriers to discharge with patient and caregiver     Problem: Skin/Tissue Integrity - Adult  Goal: Skin integrity remains intact  Recent Flowsheet Documentation  Taken 9/11/2023 1900 by Jhoan Smalls RN  Skin Integrity Remains Intact:   Monitor for areas of redness and/or skin breakdown   Assess vascular access sites hourly     Problem: Infection - Adult  Goal: Absence of infection at discharge  Recent Flowsheet Documentation  Taken 9/11/2023 1900 by Jhoan Smalls RN  Absence of infection at discharge:   Assess and monitor for signs and symptoms of infection   Monitor lab/diagnostic results   Monitor all insertion sites i.e., indwelling lines, tubes and drains  Goal: Absence of infection during hospitalization  Recent Flowsheet Documentation  Taken 9/11/2023 1900 by Jhoan Smalls RN  Absence of infection during hospitalization:   Assess and monitor for signs and symptoms of infection   Monitor lab/diagnostic results   Monitor all insertion sites i.e., indwelling lines, tubes and drains     Problem: Chronic Conditions and Co-morbidities  Goal: Patient's chronic conditions and co-morbidity symptoms are monitored and maintained or improved  Recent Flowsheet Documentation  Taken 9/11/2023 1900 by Jhoan Smalls [facility] - Patient's Chronic Conditions and Co-Morbidity Symptoms are Monitored and Maintained or Improved:   Monitor and assess patient's chronic conditions and comorbid symptoms for stability, deterioration, or improvement   Collaborate with multidisciplinary team to address chronic and comorbid conditions and prevent exacerbation or deterioration     Problem: Confusion  Goal: Confusion, delirium, dementia, or psychosis is

## 2023-09-12 NOTE — PROGRESS NOTES
TRANSFER - IN REPORT:    Verbal report received from [place], [place] on [address]  being received from ICU for routine progression of patient care      Report consisted of patient's Situation, Background, Assessment and   Recommendations(SBAR). Information from the following report(s) Nurse Handoff Report, Index, Adult Overview, Intake/Output, MAR, Recent Results, and Cardiac Rhythm A. Fib , neuro status was reviewed with the receiving nurse. Opportunity for questions and clarification was provided. Assessment completed upon patient's arrival to unit and care assumed.

## 2023-09-12 NOTE — CARE COORDINATION
CM spoke to patient's family at bedside (patient's spouse/ POA, patient's daughter and patient's son-in-law) who are all in agreement for patient to go to hospice house. CM called liaison Loraine Page who confirmed referral was received. CM to follow up.

## 2023-09-12 NOTE — PROGRESS NOTES
TRANSFER - OUT REPORT:    Verbal report given to Maria Dolores Gage on [employer]  being transferred to [facility] for routine progression of patient care       Report consisted of patient's Situation, Background, Assessment and   Recommendations(SBAR). Information from the following report(s) Nurse Handoff Report, Index, Adult Overview, Surgery Report, MAR, Cardiac Rhythm A fib, and Neuro Assessment was reviewed with the receiving nurse. Lines:   Peripheral IV 09/10/23 Right Forearm (Active)   Site Assessment Clean, dry & intact 09/12/23 0715   Line Status Blood return noted; Infusing 09/12/23 0715   Line Care Connections checked and tightened 09/12/23 0715   Phlebitis Assessment No symptoms 09/12/23 0715   Infiltration Assessment 0 09/12/23 0715   Alcohol Cap Used Yes 09/12/23 0715   Dressing Status Clean, dry & intact 09/12/23 0715   Dressing Type Transparent 09/12/23 0715       Peripheral IV 09/12/23 Proximal;Right Forearm (Active)        Opportunity for questions and clarification was provided.       Patient transported with:  O2 @ 2 L NC lpm and Registered Nurse

## 2023-09-12 NOTE — PROGRESS NOTES
Per family, they would like to transition to comfort care at this time.  Orders placed per Dr. Aruna Senior

## 2023-09-12 NOTE — PLAN OF CARE
Problem: Pain  Goal: Verbalizes/displays adequate comfort level or baseline comfort level  Outcome: Progressing     Problem: Safety - Adult  Goal: Free from fall injury  Outcome: Progressing     Problem: Skin/Tissue Integrity - Adult  Goal: Skin integrity remains intact  Outcome: Progressing  Flowsheets (Taken 9/12/2023 1327)  Skin Integrity Remains Intact:   Monitor for areas of redness and/or skin breakdown   Assess vascular access sites hourly     Problem: Infection - Adult  Goal: Absence of infection at discharge  Outcome: Progressing  Goal: Absence of infection during hospitalization  Outcome: Progressing     Problem: Skin/Tissue Integrity  Goal: Absence of new skin breakdown  Description: 1. Monitor for areas of redness and/or skin breakdown  2. Assess vascular access sites hourly  3. Every 4-6 hours minimum:  Change oxygen saturation probe site  4. Every 4-6 hours:  If on nasal continuous positive airway pressure, respiratory therapy assess nares and determine need for appliance change or resting period. Outcome: Progressing     Problem: Discharge Planning  Goal: Discharge to home or other facility with appropriate resources  Outcome: Not Progressing     Problem: Neurosensory - Adult  Goal: Achieves stable or improved neurological status  Outcome: Not Progressing     Problem: Chronic Conditions and Co-morbidities  Goal: Patient's chronic conditions and co-morbidity symptoms are monitored and maintained or improved  Outcome: Not Progressing     Problem: Confusion  Goal: Confusion, delirium, dementia, or psychosis is improved or at baseline  Description: INTERVENTIONS:  1. Assess for possible contributors to thought disturbance, including medications, impaired vision or hearing, underlying metabolic abnormalities, dehydration, psychiatric diagnoses, and notify attending LIP  2. [place] high risk fall precautions, as indicated  3.  Provide frequent short contacts to provide reality reorientation, refocusing and direction  4. Decrease environmental stimuli, including noise as appropriate  5. Monitor and intervene to maintain adequate nutrition, hydration, elimination, sleep and activity  6. If unable to ensure safety without constant attention obtain sitter and review sitter guidelines with assigned personnel  7.  Initiate Psychosocial CNS and Spiritual Care consult, as indicated  Outcome: Not Progressing

## 2023-09-12 NOTE — PROGRESS NOTES
Occupational Therapy Note:    Patient is being discharged from OT services as family has elected for comfort care at this time.      Bambi Garcia, OTR/L, CLT

## 2023-09-12 NOTE — PROGRESS NOTES
PT Note:  Therapy is discharging patient from caseload at this time due to pt/family electing comfort care. Please re-consult when/if deemed appropriate. Thank you,  Brynn Zambrano.  Dirk Munroe

## 2023-09-12 NOTE — PROGRESS NOTES
[facility] CARDIOLOGY PROGRESS NOTE           9/12/2023 9:54 AM    Admit Date: 9/8/2023      Subjective:   Patient made comfort care. Remains in atrial fibrillation with rate . ROS:  Cardiovascular:  As noted above    Objective:      Vitals:    09/12/23 0845 09/12/23 0900 09/12/23 0915 09/12/23 0930   BP: 138/63 125/63 (!) 120/58 (!) 126/56   Pulse: (!) 153 (!) 112 93 (!) 119   Resp: (!) 31 28     Temp:       TempSrc:       SpO2: 95% 95% 95% 90%   Weight:       Height:           Physical Exam:  General-No Acute Distress  Neck- supple, no JVD  CV- IRIR  Lung- clear bilaterally  Abd- soft, nontender, nondistended  Ext- no edema bilaterally. Skin- warm and dry      Data Review:   Recent Labs     09/11/23  0330 09/10/23  0331 09/09/23  0303   WBC 10.9 9.0 6.7   HGB 11.3* 12.5 12.6   HCT 34.0* 38.1 38.9   MCV 93.9 94.5 95.1   * 165 141*       Recent Labs     09/12/23  0618 09/11/23  1405   * 147*   K 4.2 3.7   * 115*   CO2 19* 25   BUN 23 14   CREATININE 1.30* 0.90   GLUCOSE 423* 229*   CALCIUM 8.7 9.1   PROT  --  6.4   LABALBU  --  3.4   BILITOT  --  1.1   ALKPHOS  --  64   AST  --  28   ALT  --  34   LABGLOM 39* >60   GLOB  --  3.0       No results for input(s): \"CKTOTAL\", \"CKMB\", \"CKMBINDEX\", \"DDIMER\", \"TROPONINI\" in the last 720 hours. Echo (9/8/23): Left Ventricle: Normal left ventricular systolic function with a visually estimated EF of 55 - 60%. Left ventricle size is normal. Mildly increased wall thickness. Normal wall motion. Right Ventricle: Right ventricle size is normal. Normal systolic function. Aortic Valve: Trileaflet valve. Mild regurgitation. No stenosis. Mitral Valve: Mild regurgitation. Left Atrium: Left atrium is severely dilated. Right Atrium: Right atrium is severely dilated.     IVC/SVC: IVC diameter is less than or equal to 21 mm and decreases greater than 50% during inspiration; therefore the estimated right atrial pressure is

## 2023-09-12 NOTE — PROGRESS NOTES
Physician Progress Note      PATIENT:               Lisandro Kaufman  CSN #:                  [id number]  :                       1932  ADMIT DATE:       2023 10:50 PM  [address] [facility] DATE:  RESPONDING  PROVIDER #:        Nell Quinones MD          QUERY TEXT:    Pt admitted with SDH. Pt noted to have elevated creatinine. If possible,   please document in the progress notes and discharge summary if you are   evaluating and/or treating any of the following: The medical record reflects the following:  Risk Factors: 80 y.o. female, with a history of has a past medical history of   Atrial fibrillation ([address]), Dementia ([address]), Diabetes mellitus ([address]),   Hypertension, and Thyroid disease.,  Clinical Indicators: Creatinine 0.92, 0.80, 0.90, 1.30  Treatment: Serial labs    Defined by Kidney Disease Improving Global Outcomes (KDIGO) clinical practice   guideline for acute kidney injury:  -Increase in SCr by greater than or equal to 0.3 mg/dl within 48 hours; or  -Increase or decrease in SCr to greater than or equal to 1.5 times baseline,   which is known or presumed to have occurred within the prior 7 days; or  -Urine volume < 0.5ml/kg/h for 6 hours  Options provided:  -- Acute kidney failure  -- Acute kidney failure with acute tubular necrosis  -- Acute kidney injury  -- Other - I will add my own diagnosis  -- Disagree - Not applicable / Not valid  -- Disagree - Clinically unable to determine / Unknown  -- Refer to Clinical Documentation Reviewer    PROVIDER RESPONSE TEXT:    This patient has an Acute kidney injury. Query created by: Adolfo Lamb on 2023 10:14 AM      QUERY TEXT:    Patient admitted with bilateral SDH. If possible, please document in progress   notes and discharge summary if you are treating/evaluating any of the   following: The medical record reflects the following:?  ?? Risk Factors: 80 y.o. female with dementia and repeated falls?   ?? Clinical Indicators: Per  neurosurgery note

## 2023-09-13 PROBLEM — E11.9 DIABETES MELLITUS, TYPE II (HCC): Status: ACTIVE | Noted: 2018-02-14

## 2023-09-13 PROBLEM — R42 DIZZINESS: Status: ACTIVE | Noted: 2018-02-14

## 2023-09-13 PROBLEM — R00.1 BRADYCARDIA: Status: ACTIVE | Noted: 2019-05-15

## 2023-09-13 PROBLEM — I50.21 ACUTE SYSTOLIC CONGESTIVE HEART FAILURE (HCC): Status: ACTIVE | Noted: 2019-10-07

## 2023-09-13 PROBLEM — I44.7 LEFT BUNDLE BRANCH BLOCK: Status: ACTIVE | Noted: 2018-02-14

## 2023-09-13 PROBLEM — R07.81 RIB PAIN ON RIGHT SIDE: Status: ACTIVE | Noted: 2020-05-19

## 2023-09-13 NOTE — CARE COORDINATION
Patient has discharge orders in on this day. Per NP note, \"patient was accepted for admission to [facility] for today\". Patient's family in agreement with this discharge plan today. No CM needs voiced or noted at this time. ASSESSMENT NOTE    Attending Physician: Guera Brown MD  Admit Problem: SDH (subdural hematoma) ([address]) [S06. 5XAA]  Date/Time of Admission: 9/8/2023 10:50 PM  Problem List:  Patient Active Problem List   Diagnosis    SDH (subdural hematoma) (HCC)    Permanent atrial fibrillation (HCC)    Dementia (HCC)    Primary hypertension    Subdural hematoma (HCC)       Service Assessment  Patient Orientation Unable to Assess   Cognition Other (see comment) (unresponsive)   History Provided By Child/Family, Spouse   Primary Caregiver Self   Accompanied By/Relationship spouse, daughter, son   Support Systems Spouse/Significant Other, Children, Family Members   Patient's Shameka Matters is: Named in [address]   PCP Verified by CM Yes   Last Visit to PCP     Prior Functional Level Independent in ADLs/IADLs   Current Functional Level Assistance with the following:   Can patient return to prior living arrangement Unknown at present   Ability to make needs known: Unable   Family able to assist with home care needs: No   Would you like for me to discuss the discharge plan with any other family members/significant others, and if so, who?  Yes   Financial Resources Medicare [facility]   Community Resources None   CM/SW Referral Other (see comment) (pending)     Social/Functional History  Lives With Spouse   Type of [address] [place] [address]  One level   Home Access Level entry   Entrance Stairs - Number of Steps     Entrance Stairs - Rails     Bathroom Shower/Tub     Bathroom Toilet     Bathroom Equipment     Bathroom Accessibility     Home Equipment None   Receives Help From Family   ADL Assistance Independent   Bath     Dressing     Grooming     Feeding     Toileting [facility] assistance   Meal Prep     Laundry     Vacuuming     Cleaning     Gardening     Yard Work     Driving     Shopping          Other (Comment)     Homemaking Responsibilities No   Meal [address] Paying/Finance Responsibility     [address] Management     Other (Comment)     Ambulation Assistance Independent   Transfer Assistance Independent   Active  No   Patient's  Info family   Mode of Transportation     Education     Occupation Retired   Type of Occupation       Discharge Planning   Type of Residence Other (Comment) (pending)   Living Arrangements Spouse/Significant Other (daughters are in [address] all day)   [address] [facility] Spouse/Significant Other, Children, Family Members   Current Services Prior To Admission None   [address]   DME     DME     DME Ordered? Potential Assistance Purchasing Medications No   Meds-to-Beds: Does the patient want to have any new prescriptions delivered to bedside prior to discharge? Type of Home Care Services None   Patient expects to be discharged to: Unknown   Follow Up Appointment: Best Day/Time     One/Two Story Residence:     # of Interior Steps     Height of Each Step (in)     [employer] Available     History of Falls? Services At/After Discharge  Transition of Care Consult (CM Consult): Internal Home Health     Internal Hospice     Reason Outside Agency [facility]     Partner SNF     Reason Why Partner SNF Not Chosen     Internal Comfort Care     Reason Outside [address] Discharge     [address] Provided?      Mode of Transport at [facility] Time of Discharge     Confirm Follow Up Transport Family     Condition of Participation: Discharge Planning  The

## 2023-09-13 NOTE — PLAN OF CARE
Problem: Discharge Planning  Goal: Discharge to home or other facility with appropriate resources  9/12/2023 2354 by Aubrey Angel RN  Outcome: Progressing  9/12/2023 1429 by Hanane Oden RN  Outcome: Not Progressing     Problem: Neurosensory - Adult  Goal: Achieves stable or improved neurological status  9/12/2023 2354 by Aubrey Angel RN  Outcome: Progressing  9/12/2023 1429 by Hanane Oden RN  Outcome: Not Progressing     Problem: Chronic Conditions and Co-morbidities  Goal: Patient's chronic conditions and co-morbidity symptoms are monitored and maintained or improved  9/12/2023 2354 by Aubrey Angel RN  Outcome: Progressing  9/12/2023 1429 by Hanane Oden RN  Outcome: Not Progressing     Problem: Confusion  Goal: Confusion, delirium, dementia, or psychosis is improved or at baseline  Description: INTERVENTIONS:  1. Assess for possible contributors to thought disturbance, including medications, impaired vision or hearing, underlying metabolic abnormalities, dehydration, psychiatric diagnoses, and notify attending LIP  2. [place] high risk fall precautions, as indicated  3. Provide frequent short contacts to provide reality reorientation, refocusing and direction  4. Decrease environmental stimuli, including noise as appropriate  5. Monitor and intervene to maintain adequate nutrition, hydration, elimination, sleep and activity  6. If unable to ensure safety without constant attention obtain sitter and review sitter guidelines with assigned personnel  7.  Initiate Psychosocial CNS and Spiritual Care consult, as indicated  9/12/2023 2354 by Aubrey Angel RN  Outcome: Progressing  9/12/2023 1429 by Hanane Oden RN  Outcome: Not Progressing

## 2023-09-13 NOTE — PROGRESS NOTES
SPEECH PATHOLOGY NOTE    Patient has transitioned to comfort care. Speech therapy to sign off. Please consider new concerns if new needs arise at a later time. Thank you for the opportunity to participate in her care.      YSABEL Galarza, CCC-SLP  Speech Language Pathologist  Acute Rehabilitation Services  Contact: [place]

## 2023-09-13 NOTE — PROGRESS NOTES
TRANSFER - OUT REPORT:    Verbal report given to [facility], RN on [address]  being transferred to [facility] for routine progression of patient care       Report consisted of patient's Situation, Background, Assessment and   Recommendations(SBAR). Information from the following report(s) Nurse Handoff Report, Index, Adult Overview, Intake/Output, MAR, Recent Results, Cardiac Rhythm A. Fib, and Neuro Assessment was reviewed with the receiving nurse. Lines:   Peripheral IV 09/10/23 Right Forearm (Active)   Site Assessment Clean, dry & intact 09/12/23 1945   Line Status Flushed;Capped 09/12/23 [address] checked and tightened 09/12/23 1945   Phlebitis Assessment No symptoms 09/12/23 1945   Infiltration Assessment 0 09/12/23 1945   Alcohol Cap Used Yes 09/12/23 1945   Dressing Status Clean, dry & intact 09/12/23 1945   Dressing Type Transparent 09/12/23 1945       Peripheral IV 09/12/23 Proximal;Right Forearm (Active)   Site Assessment Clean, dry & intact 09/12/23 1945   Line Status Capped;Flushed 09/12/23 1945   Line Care Connections checked and tightened 09/12/23 1945   Phlebitis Assessment No symptoms 09/12/23 1945   Infiltration Assessment 0 09/12/23 1945   Alcohol Cap Used Yes 09/12/23 1945   Dressing Status Clean, dry & intact 09/12/23 1945   Dressing Type Transparent 09/12/23 1945        Opportunity for questions and clarification was provided.       Patient transported with:  O2 @ 2lpm

## 2023-09-13 NOTE — PROGRESS NOTES
[facility]    Liaison met with the daughter and patient's spouse at the bedside. Spouse confirmed that he approves for the patient  to transfer to [facility]. Consents signed at the bedside with the spouse and DC packet including the DNR was left at the nursing station. New and updated VS were taken and patient was deemed medically stable to transfer. Patient is to be admitted at the hospice house at 12:00 noon.     Thank you for this referral,  Ciro Barriga, BSN, RN  [facility]  [phone]

## 2023-09-13 NOTE — CONSULTS
Consult received, chart reviewed. Pt is now comfort measures only, and hospice has been consulted. No PC needs, consult will not be completed.     Jon Bradford, FNP-C  Palliative Care

## 2023-09-22 NOTE — PROGRESS NOTES
Physician Progress Note      PATIENT:               Haritha Seay  CSN #:                  [id number]  :                       1932  ADMIT DATE:       2023 10:50 PM  [address] [facility] DATE:        2023 12:10 PM  RESPONDING  PROVIDER #:        Kyle Hutson NP          QUERY TEXT:    Pt admitted with traumatic bilateral SDH. Pt noted to have mass effect &   midline shift. If clinically significant, please document in progress notes   and discharge summary if you are evaluating/treating any of the following: The medical record reflects the following:  Risk Factors: 80 y.o. female with medical history of atrial fibrillation   currently not on anticoagulation, dementia, hypertension, presents in setting   of worsening mentation. ? Clinical Indicators: Per  neurosurgery note \"Imaging reviewed - large L   FTP sa on cSDH with likely membrane formation. Smaller R convexity collection   with 4mm left to right midline shift with patent basilar cisterns, but   effaced cortical sulci. \"   neurosurgery note \"Significant mass effect on the left\"  Treatment: Neurosurgery & neurology consulted, neuro checks, CT head    Thank you,  Honey Pinzon RN, BSN, [email or web address]  . Options provided:  -- Cerebral edema  -- Other - I will add my own diagnosis  -- Disagree - Not applicable / Not valid  -- Disagree - Clinically unable to determine / Unknown  -- Refer to Clinical Documentation Reviewer    PROVIDER RESPONSE TEXT:    Provider disagreed with this query.     Query created by: Honey Pinzon on 2023 1:40 PM      Electronically signed by:  Kyle Hutson NP 2023 8:35 AM

## (undated) DEVICE — SYRINGE IRRIG 60ML SFT PLIABLE BLB EZ TO GRP 1 HND USE W/

## (undated) DEVICE — AGENT HEMOSTATIC SURGIFLOW MATRIX KIT W/THROMBIN

## (undated) DEVICE — GLOVE ORANGE PI 7   MSG9070

## (undated) DEVICE — 2.3MM SPIRAL ROUTER

## (undated) DEVICE — APPLICATOR MEDICATED 26 CC SOLUTION HI LT ORNG CHLORAPREP

## (undated) DEVICE — 1LYRTR 16FR10ML 100%SILI SNAP: Brand: MEDLINE INDUSTRIES, INC.

## (undated) DEVICE — INTENDED FOR TISSUE SEPARATION, AND OTHER PROCEDURES THAT REQUIRE A SHARP SURGICAL BLADE TO PUNCTURE OR CUT.: Brand: BARD-PARKER ® STAINLESS STEEL BLADES

## (undated) DEVICE — CRANIOTOMY: Brand: MEDLINE INDUSTRIES, INC.

## (undated) DEVICE — CODMAN® DISPOSABLE PERFORATOR 14MM: Brand: CODMAN®

## (undated) DEVICE — BLADE ES ELASTOMERIC COAT INSUL DURABLE BEND UPTO 90DEG

## (undated) DEVICE — SUTURE NRLN SZ 4-0 L18IN NONABSORBABLE BLK L13MM TF 1/2 CIR C584D

## (undated) DEVICE — Device

## (undated) DEVICE — 6.0MM PRECISION ROUND

## (undated) DEVICE — CODMAN® RANEY SCALP CLIPS 20 UNITS OF 10 CLIPS: Brand: CODMAN®

## (undated) DEVICE — GLOVE SURG SZ 75 L12IN FNGR THK79MIL GRN LTX FREE

## (undated) DEVICE — SPONGE,NEURO,0.5"X0.5",XR,STRL,10/PK: Brand: MEDLINE

## (undated) DEVICE — SUTURE VCRL SZ 2-0 L18IN ABSRB UD L26MM CP-2 1/2 CIR REV J762D